# Patient Record
Sex: FEMALE | Race: WHITE | Employment: OTHER | URBAN - METROPOLITAN AREA
[De-identification: names, ages, dates, MRNs, and addresses within clinical notes are randomized per-mention and may not be internally consistent; named-entity substitution may affect disease eponyms.]

---

## 2023-09-14 PROBLEM — R44.8 FACIAL PRESSURE: Status: ACTIVE | Noted: 2023-09-14

## 2023-09-14 PROBLEM — F41.9 ANXIETY: Status: ACTIVE | Noted: 2023-09-14

## 2023-09-14 PROBLEM — E03.8 SUBCLINICAL HYPOTHYROIDISM: Status: ACTIVE | Noted: 2023-09-14

## 2023-09-14 PROBLEM — H25.10 NUCLEAR SENILE CATARACT: Status: ACTIVE | Noted: 2023-09-14

## 2023-09-14 PROBLEM — H04.129 TEAR FILM INSUFFICIENCY: Status: ACTIVE | Noted: 2023-09-14

## 2023-09-14 PROBLEM — H52.7 UNSPECIFIED DISORDER OF REFRACTION: Status: ACTIVE | Noted: 2023-09-14

## 2023-09-14 PROBLEM — J32.9 RECURRENT SINUSITIS: Status: ACTIVE | Noted: 2023-09-14

## 2023-09-14 PROBLEM — E78.00 HYPERCHOLESTEROLEMIA: Status: ACTIVE | Noted: 2023-09-14

## 2023-09-14 PROBLEM — E55.9 VITAMIN D DEFICIENCY: Status: ACTIVE | Noted: 2023-09-14

## 2023-09-14 PROBLEM — J30.9 ALLERGIC RHINITIS: Status: ACTIVE | Noted: 2023-09-14

## 2023-09-14 PROBLEM — Z85.72 HISTORY OF NON-HODGKIN'S LYMPHOMA: Status: ACTIVE | Noted: 2023-09-14

## 2023-09-14 PROBLEM — H40.1132 PRIMARY OPEN ANGLE GLAUCOMA (POAG) OF BOTH EYES, MODERATE STAGE: Status: ACTIVE | Noted: 2023-09-14

## 2023-09-14 RX ORDER — LATANOPROST 50 UG/ML
1 SOLUTION/ DROPS OPHTHALMIC EVERY EVENING
COMMUNITY
Start: 2022-05-05 | End: 2024-01-29 | Stop reason: SDUPTHER

## 2023-09-14 RX ORDER — GLUCOSAM/CHONDRO/HERB 149/HYAL 750-100 MG
1 TABLET ORAL DAILY
COMMUNITY
End: 2023-12-20 | Stop reason: ALTCHOICE

## 2023-09-14 RX ORDER — CHOLECALCIFEROL (VITAMIN D3) 50 MCG
2000 TABLET ORAL DAILY
COMMUNITY
End: 2023-12-20 | Stop reason: ALTCHOICE

## 2023-09-14 RX ORDER — TIMOLOL MALEATE 5 MG/ML
1 SOLUTION/ DROPS OPHTHALMIC DAILY
COMMUNITY
End: 2023-12-20 | Stop reason: ALTCHOICE

## 2023-09-14 RX ORDER — IBUPROFEN 100 MG/5ML
1000 SUSPENSION, ORAL (FINAL DOSE FORM) ORAL DAILY
COMMUNITY
End: 2023-12-20 | Stop reason: ALTCHOICE

## 2023-09-20 DIAGNOSIS — Z12.31 SCREENING MAMMOGRAM FOR BREAST CANCER: ICD-10-CM

## 2023-09-20 DIAGNOSIS — C82.53 DIFFUSE FOLLICLE CENTER LYMPHOMA OF INTRA-ABDOMINAL LYMPH NODES (MULTI): Primary | ICD-10-CM

## 2023-10-02 ENCOUNTER — ANCILLARY PROCEDURE (OUTPATIENT)
Dept: RADIOLOGY | Facility: CLINIC | Age: 78
End: 2023-10-02
Payer: MEDICARE

## 2023-10-02 VITALS — BODY MASS INDEX: 28.32 KG/M2 | WEIGHT: 170 LBS | HEIGHT: 65 IN

## 2023-10-02 DIAGNOSIS — Z12.31 SCREENING MAMMOGRAM FOR BREAST CANCER: ICD-10-CM

## 2023-10-02 DIAGNOSIS — C82.53 DIFFUSE FOLLICLE CENTER LYMPHOMA, INTRA-ABDOMINAL LYMPH NODES (MULTI): Primary | ICD-10-CM

## 2023-10-02 PROCEDURE — G1004 CDSM NDSC: HCPCS

## 2023-10-02 PROCEDURE — 71270 CT THORAX DX C-/C+: CPT | Mod: MG

## 2023-10-02 PROCEDURE — 77063 BREAST TOMOSYNTHESIS BI: CPT | Mod: 50

## 2023-10-02 PROCEDURE — 2550000001 HC RX 255 CONTRASTS: Performed by: NURSE PRACTITIONER

## 2023-10-02 PROCEDURE — 74178 CT ABD&PLV WO CNTR FLWD CNTR: CPT | Mod: MG

## 2023-10-02 RX ADMIN — IOHEXOL 75 ML: 350 INJECTION, SOLUTION INTRAVENOUS at 15:36

## 2023-10-04 ENCOUNTER — TELEPHONE (OUTPATIENT)
Dept: HEMATOLOGY/ONCOLOGY | Facility: CLINIC | Age: 78
End: 2023-10-04
Payer: MEDICARE

## 2023-10-04 NOTE — TELEPHONE ENCOUNTER
Patient is calling to get CT results that was order by Kj Pinto, CNP - scan was completed at [facility] 10.02.2023.

## 2023-10-06 ENCOUNTER — TELEPHONE (OUTPATIENT)
Dept: OBSTETRICS AND GYNECOLOGY | Facility: CLINIC | Age: 78
End: 2023-10-06
Payer: MEDICARE

## 2023-10-06 NOTE — TELEPHONE ENCOUNTER
NP to practice ( was last seen by wc 02/25/2014 Annual ) . Pt states that she was referred by Kj Pinto CNP to see Dr German for endometrial thickening / pt states has ct scan 09/29/2023 / per ct endometrial thickening 10 mm / advised pt a message would be sent to Dr German to view CT scan / pt aware Dr German out of the office today /office will call with  recommendation

## 2023-10-09 ENCOUNTER — TELEPHONE (OUTPATIENT)
Dept: PRIMARY CARE | Facility: CLINIC | Age: 78
End: 2023-10-09
Payer: MEDICARE

## 2023-10-09 DIAGNOSIS — R93.89 ABNORMAL VAGINAL BLEEDING WITH ENDOMETRIAL THICKNESS GREATER THAN 5 MM PRESENT ON TRANSVAGINAL ULTRASOUND IN POSTMENOPAUSAL PATIENT: Primary | ICD-10-CM

## 2023-10-09 DIAGNOSIS — J40 BRONCHITIS: Primary | ICD-10-CM

## 2023-10-09 DIAGNOSIS — N95.0 ABNORMAL VAGINAL BLEEDING WITH ENDOMETRIAL THICKNESS GREATER THAN 5 MM PRESENT ON TRANSVAGINAL ULTRASOUND IN POSTMENOPAUSAL PATIENT: Primary | ICD-10-CM

## 2023-10-09 RX ORDER — AZITHROMYCIN 250 MG/1
TABLET, FILM COATED ORAL
Qty: 6 TABLET | Refills: 0 | Status: SHIPPED | OUTPATIENT
Start: 2023-10-09 | End: 2023-10-19 | Stop reason: WASHOUT

## 2023-10-09 NOTE — TELEPHONE ENCOUNTER
Pt c/o fever .8, heavyness in chest - chest congestion, sore throat -scratchy now, cough, headache, Covid test neg. Sick  X 4 days   [facility]  [place] [facility]   Last visit 4/18/23 sloan, no future appt.   You have not seen pt since 2020

## 2023-10-10 NOTE — TELEPHONE ENCOUNTER
Spoke to pt advised need for US / CS number given / pt aware she needs to call office with US date so f/up appt with WC can be sched

## 2023-10-11 ENCOUNTER — ANCILLARY PROCEDURE (OUTPATIENT)
Dept: RADIOLOGY | Facility: CLINIC | Age: 78
End: 2023-10-11
Payer: MEDICARE

## 2023-10-11 DIAGNOSIS — R93.89 ABNORMAL VAGINAL BLEEDING WITH ENDOMETRIAL THICKNESS GREATER THAN 5 MM PRESENT ON TRANSVAGINAL ULTRASOUND IN POSTMENOPAUSAL PATIENT: ICD-10-CM

## 2023-10-11 DIAGNOSIS — N95.0 ABNORMAL VAGINAL BLEEDING WITH ENDOMETRIAL THICKNESS GREATER THAN 5 MM PRESENT ON TRANSVAGINAL ULTRASOUND IN POSTMENOPAUSAL PATIENT: ICD-10-CM

## 2023-10-13 NOTE — PROGRESS NOTES
Endometrial biopsy    Date/Time: 10/19/2023 1:22 PM    Performed by: Susan German MD  Authorized by: Susan German MD    Consent:     Consent obtained: written    Consent given by: patient    Risks discussed: bleeding and pain    Alternatives discussed: observation and referral    Patient agrees, verbalizes understanding, and wants to proceed: yes      Procedure explained and questions answered to patient or proxy's satisfaction: yes    Indications:     Indications: thickened endometrium    Pre-procedure:     Urine pregnancy test: N/A      Premeds: misoprostol and NSAID    Procedure:     A bimanual exam was performed: yes      Uterus size: 6-8 weeks    Uterus position: retroverted    Prepped with: Betadine    Tenaculum used: yes      A local block was performed: yes      Block method: paracervical block      Local anesthetic: lidocaine 2% WITH epi    Amount used (mL): 1.5    Cervix dilated: yes      Number of passes: 3  Findings:     Cervix: normal      Uterus depth by sound (cm): 5    Specimen collected: specimen collected and sent to pathology      Patient tolerance: tolerated well, no immediate complications  Comments:     Procedure comments: Polypoid lesion extruding from os; does NOT completely avulse with Alligator forceps; sample obtained as well as upper endometrial tissue aspirated w pipelle.

## 2023-10-19 ENCOUNTER — PROCEDURE VISIT (OUTPATIENT)
Dept: OBSTETRICS AND GYNECOLOGY | Facility: CLINIC | Age: 78
End: 2023-10-19
Payer: MEDICARE

## 2023-10-19 VITALS — DIASTOLIC BLOOD PRESSURE: 68 MMHG | BODY MASS INDEX: 29.16 KG/M2 | WEIGHT: 175 LBS | SYSTOLIC BLOOD PRESSURE: 128 MMHG

## 2023-10-19 DIAGNOSIS — N95.0 ABNORMAL VAGINAL BLEEDING WITH ENDOMETRIAL THICKNESS GREATER THAN 5 MM PRESENT ON TRANSVAGINAL ULTRASOUND IN POSTMENOPAUSAL PATIENT: Primary | ICD-10-CM

## 2023-10-19 DIAGNOSIS — R93.89 ABNORMAL VAGINAL BLEEDING WITH ENDOMETRIAL THICKNESS GREATER THAN 5 MM PRESENT ON TRANSVAGINAL ULTRASOUND IN POSTMENOPAUSAL PATIENT: Primary | ICD-10-CM

## 2023-10-19 PROCEDURE — 58100 BIOPSY OF UTERUS LINING: CPT | Performed by: OBSTETRICS & GYNECOLOGY

## 2023-10-19 PROCEDURE — 99999 PR OFFICE/OUTPT VISIT,PROCEDURE ONLY: CPT | Performed by: OBSTETRICS & GYNECOLOGY

## 2023-10-19 PROCEDURE — 88305 TISSUE EXAM BY PATHOLOGIST: CPT | Performed by: STUDENT IN AN ORGANIZED HEALTH CARE EDUCATION/TRAINING PROGRAM

## 2023-10-19 PROCEDURE — 88305 TISSUE EXAM BY PATHOLOGIST: CPT | Mod: TC,SUR | Performed by: OBSTETRICS & GYNECOLOGY

## 2023-10-19 ASSESSMENT — LIFESTYLE VARIABLES
SKIP TO QUESTIONS 9-10: 1
HOW OFTEN DO YOU HAVE SIX OR MORE DRINKS ON ONE OCCASION: NEVER
HOW OFTEN DO YOU HAVE A DRINK CONTAINING ALCOHOL: MONTHLY OR LESS
AUDIT-C TOTAL SCORE: 1
HOW MANY STANDARD DRINKS CONTAINING ALCOHOL DO YOU HAVE ON A TYPICAL DAY: 1 OR 2

## 2023-10-19 ASSESSMENT — ENCOUNTER SYMPTOMS
LOSS OF SENSATION IN FEET: 0
DEPRESSION: 0
OCCASIONAL FEELINGS OF UNSTEADINESS: 0

## 2023-10-19 ASSESSMENT — PATIENT HEALTH QUESTIONNAIRE - PHQ9
1. LITTLE INTEREST OR PLEASURE IN DOING THINGS: NOT AT ALL
SUM OF ALL RESPONSES TO PHQ9 QUESTIONS 1 AND 2: 0
2. FEELING DOWN, DEPRESSED OR HOPELESS: NOT AT ALL

## 2023-10-19 NOTE — PROGRESS NOTES
NEW pt to [facility]; last gyn exam 2014 w WC; under tx for lymphoma at [facility];   here due to INCIDENTAL finding thickened endometrium during CT scan for lymphoma surveillance. Completed pelvic us=2cm complex cystic/solid tissue within endometrium.    Pt denies any/all vag bleed.

## 2023-10-27 ENCOUNTER — TELEPHONE (OUTPATIENT)
Dept: OBSTETRICS AND GYNECOLOGY | Facility: CLINIC | Age: 78
End: 2023-10-27
Payer: MEDICARE

## 2023-10-27 NOTE — TELEPHONE ENCOUNTER
Est pt last seen 10/19/2023  EMB / pt returning WC call for BX results / advised pt Dr German in surgery today / advised will send message to Dr German of returned call / pt state that she will have cell phone available throughout the day . Message to WC

## 2023-10-27 NOTE — TELEPHONE ENCOUNTER
Spoke to pt advised of Dr Hernández message / advised Dr German or office will call when results reviewed by Dr German

## 2023-10-30 LAB
LABORATORY COMMENT REPORT: NORMAL
PATH REPORT.COMMENTS IMP SPEC: NORMAL
PATH REPORT.FINAL DX SPEC: NORMAL
PATH REPORT.GROSS SPEC: NORMAL
PATH REPORT.RELEVANT HX SPEC: NORMAL
PATH REPORT.TOTAL CANCER: NORMAL

## 2023-10-31 NOTE — RESULT ENCOUNTER NOTE
Patient needs D and C for very thickened endometrium/probable polyp; plz advise dates OR blocks still have time left for 2023.

## 2023-11-05 PROBLEM — R93.5 ABNORMAL ULTRASOUND OF ENDOMETRIUM: Status: ACTIVE | Noted: 2023-11-05

## 2023-11-05 NOTE — PROGRESS NOTES
"Koi:  Wendy is an established 78-year-old white female here with her  Ed to schedule a hysteroscopic D&C.  Patient w/o gyn care since 2014, called 10/6/2023 to report incidental finding of 10 mm endometrium  on CAT scan that was ordered by her heme-onc nurse practitioner as part of her routine surveillance for her non-Hodgkin's lymphoma.  Subsequent ultrasound evaluation on 10/11/2023  reports a  2.2 x 1.7 cm  complex cystic/solid tissue growth within uterine cavity;   10/19/2023 pipelle bx= fragments atrophic endometrium, nl endocerv glandular and squamous cells.  Which is consistent with focal lesions not fully biopsied by Pipelle.  Pt denies vag bleed or pain.  OBJ: Visit Vitals  /68   Ht 1.626 m (5' 4\")   Wt 79.4 kg (175 lb)   BMI 30.04 kg/m²   OB Status Postmenopausal   Smoking Status Never   BSA 1.89 m²    Exam: alert and oriented x 3; pleasant  Encounter Diagnosis   Name Primary?    Abnormal ultrasound of endometrium;  irregular appearing endometrial growth needing tissue sampling/resection.  Using anatomic diagrams, the procedural details of hysteroscopy D&C were explained to the patient and her .  Specific mention was made of risks of bleeding, infection, and uterine perforation necessitating laparotomy.  Thus consented, patient wishes to proceed .patient was given the [employer] pamphlet on hysteroscopy to review at her leisure at home. Yes   Given age, history of lymphoma, and no recent physical exam I am referring her back to her primary care physician Dr. Kirkland for preoperative medical clearance.    PCP= Marita; needs preop clearance  "

## 2023-11-07 ENCOUNTER — PREP FOR PROCEDURE (OUTPATIENT)
Dept: OBSTETRICS AND GYNECOLOGY | Facility: CLINIC | Age: 78
End: 2023-11-07
Payer: MEDICARE

## 2023-11-07 ENCOUNTER — OFFICE VISIT (OUTPATIENT)
Dept: OBSTETRICS AND GYNECOLOGY | Facility: CLINIC | Age: 78
End: 2023-11-07
Payer: MEDICARE

## 2023-11-07 VITALS
BODY MASS INDEX: 29.88 KG/M2 | DIASTOLIC BLOOD PRESSURE: 68 MMHG | HEIGHT: 64 IN | WEIGHT: 175 LBS | SYSTOLIC BLOOD PRESSURE: 118 MMHG

## 2023-11-07 DIAGNOSIS — R93.5 ABNORMAL ULTRASOUND OF ENDOMETRIUM: Primary | ICD-10-CM

## 2023-11-07 DIAGNOSIS — N95.0 PMB (POSTMENOPAUSAL BLEEDING): Primary | ICD-10-CM

## 2023-11-07 DIAGNOSIS — R93.5 ABNORMAL ULTRASOUND OF ENDOMETRIUM: ICD-10-CM

## 2023-11-07 PROCEDURE — 99214 OFFICE O/P EST MOD 30 MIN: CPT | Performed by: OBSTETRICS & GYNECOLOGY

## 2023-11-07 PROCEDURE — 1159F MED LIST DOCD IN RCRD: CPT | Performed by: OBSTETRICS & GYNECOLOGY

## 2023-11-07 PROCEDURE — 1036F TOBACCO NON-USER: CPT | Performed by: OBSTETRICS & GYNECOLOGY

## 2023-11-07 RX ORDER — GABAPENTIN 600 MG/1
600 TABLET ORAL ONCE
Status: CANCELLED | OUTPATIENT
Start: 2023-11-07 | End: 2023-11-07

## 2023-11-07 RX ORDER — ACETAMINOPHEN 325 MG/1
975 TABLET ORAL ONCE
Status: CANCELLED | OUTPATIENT
Start: 2023-11-07 | End: 2023-11-07

## 2023-11-07 RX ORDER — CELECOXIB 50 MG/1
400 CAPSULE ORAL ONCE
Status: CANCELLED | OUTPATIENT
Start: 2023-11-07 | End: 2023-11-07

## 2023-12-20 ENCOUNTER — PRE-ADMISSION TESTING (OUTPATIENT)
Dept: PREADMISSION TESTING | Facility: HOSPITAL | Age: 78
End: 2023-12-20
Payer: MEDICARE

## 2023-12-20 VITALS
WEIGHT: 175.71 LBS | OXYGEN SATURATION: 100 % | SYSTOLIC BLOOD PRESSURE: 134 MMHG | BODY MASS INDEX: 30 KG/M2 | DIASTOLIC BLOOD PRESSURE: 56 MMHG | TEMPERATURE: 97.9 F | HEIGHT: 64 IN | RESPIRATION RATE: 16 BRPM | HEART RATE: 56 BPM

## 2023-12-20 DIAGNOSIS — N95.0 PMB (POSTMENOPAUSAL BLEEDING): ICD-10-CM

## 2023-12-20 DIAGNOSIS — Z01.818 PRE-OP TESTING: Primary | ICD-10-CM

## 2023-12-20 DIAGNOSIS — R93.5 ABNORMAL ULTRASOUND OF ENDOMETRIUM: ICD-10-CM

## 2023-12-20 LAB
ABO GROUP (TYPE) IN BLOOD: NORMAL
ANTIBODY SCREEN: NORMAL
ERYTHROCYTE [DISTWIDTH] IN BLOOD BY AUTOMATED COUNT: 13.1 % (ref 11.5–14.5)
HCT VFR BLD AUTO: 46 % (ref 36–46)
HGB BLD-MCNC: 15 G/DL (ref 12–16)
MCH RBC QN AUTO: 29.8 PG (ref 26–34)
MCHC RBC AUTO-ENTMCNC: 32.6 G/DL (ref 32–36)
MCV RBC AUTO: 92 FL (ref 80–100)
NRBC BLD-RTO: 0 /100 WBCS (ref 0–0)
PLATELET # BLD AUTO: 219 X10*3/UL (ref 150–450)
RBC # BLD AUTO: 5.03 X10*6/UL (ref 4–5.2)
RH FACTOR (ANTIGEN D): NORMAL
WBC # BLD AUTO: 6.7 X10*3/UL (ref 4.4–11.3)

## 2023-12-20 PROCEDURE — 86900 BLOOD TYPING SEROLOGIC ABO: CPT

## 2023-12-20 PROCEDURE — 99213 OFFICE O/P EST LOW 20 MIN: CPT | Performed by: NURSE PRACTITIONER

## 2023-12-20 PROCEDURE — 36415 COLL VENOUS BLD VENIPUNCTURE: CPT

## 2023-12-20 PROCEDURE — 85027 COMPLETE CBC AUTOMATED: CPT

## 2023-12-20 RX ORDER — ACETAMINOPHEN 500 MG
1000 TABLET ORAL EVERY 6 HOURS PRN
COMMUNITY
End: 2024-03-20 | Stop reason: WASHOUT

## 2023-12-20 RX ORDER — DORZOLAMIDE HYDROCHLORIDE AND TIMOLOL MALEATE 20; 5 MG/ML; MG/ML
1 SOLUTION/ DROPS OPHTHALMIC 2 TIMES DAILY
COMMUNITY
Start: 2023-10-30 | End: 2024-01-29 | Stop reason: SDUPTHER

## 2023-12-20 ASSESSMENT — ENCOUNTER SYMPTOMS
COUGH: 1
SINUS PRESSURE: 1

## 2023-12-20 ASSESSMENT — DUKE ACTIVITY SCORE INDEX (DASI)
TOTAL_SCORE: 39.45
CAN YOU CLIMB A FLIGHT OF STAIRS OR WALK UP A HILL: YES
CAN YOU DO LIGHT WORK AROUND THE HOUSE LIKE DUSTING OR WASHING DISHES: YES
CAN YOU RUN A SHORT DISTANCE: YES
CAN YOU TAKE CARE OF YOURSELF (EAT, DRESS, BATHE, OR USE TOILET): YES
CAN YOU PARTICIPATE IN STRENOUS SPORTS LIKE SWIMMING, SINGLES TENNIS, FOOTBALL, BASKETBALL, OR SKIING: NO
DASI METS SCORE: 7.6
CAN YOU DO HEAVY WORK AROUND THE HOUSE LIKE SCRUBBING FLOORS OR LIFTING AND MOVING HEAVY FURNITURE: YES
CAN YOU PARTICIPATE IN MODERATE RECREATIONAL ACTIVITIES LIKE GOLF, BOWLING, DANCING, DOUBLES TENNIS OR THROWING A BASEBALL OR FOOTBALL: NO
CAN YOU DO YARD WORK LIKE RAKING LEAVES, WEEDING OR PUSHING A MOWER: YES
CAN YOU HAVE SEXUAL RELATIONS: NO
CAN YOU DO MODERATE WORK AROUND THE HOUSE LIKE VACUUMING, SWEEPING FLOORS OR CARRYING GROCERIES: YES
CAN YOU WALK A BLOCK OR TWO ON LEVEL GROUND: YES
CAN YOU WALK INDOORS, SUCH AS AROUND YOUR HOUSE: YES

## 2023-12-20 ASSESSMENT — CHADS2 SCORE
PRIOR STROKE OR TIA OR THROMBOEMBOLISM: NO
DIABETES: NO
CHADS2 SCORE: 1
CHF: NO
HYPERTENSION: NO
AGE GREATER THAN OR EQUAL TO 75: YES

## 2023-12-20 ASSESSMENT — PAIN - FUNCTIONAL ASSESSMENT: PAIN_FUNCTIONAL_ASSESSMENT: 0-10

## 2023-12-20 ASSESSMENT — LIFESTYLE VARIABLES: SMOKING_STATUS: SMOKER

## 2023-12-20 ASSESSMENT — PAIN SCALES - GENERAL: PAINLEVEL_OUTOF10: 0 - NO PAIN

## 2023-12-20 NOTE — CPM/PAT H&P
CPM/PAT Evaluation       Name: Wendy Oswald (Wendy Oswald)  /Age: 1945/78 y.o.     In-Person       Chief Complaint:  removing polyp from uterus    HPI Found a uterine polyp on her routine gyn testing which has been done every 6 months since she has had NHL in  treated with chemo. She has no symptoms, preliminary testing showed it was benign but her GYN said needs to be removed. She denies any fever, chills, sob, abd pain, n/v/c/d. She has had some sinus drainage and has an occasional cough.    Past Medical History:   Diagnosis Date    Abnormal abdominal ultrasound     Allergic rhinitis     Anxiety     History of non-Hodgkin's lymphoma     Nuclear senile cataract     Personal history of malignant neoplasm, unspecified     History of malignant neoplasm    Postmenopausal bleeding     Primary open angle glaucoma of both eyes, moderate stage     Pure hypercholesterolemia     Sinusitis     Subclinical hypothyroidism        Past Surgical History:   Procedure Laterality Date    ANKLE ARTHROPLASTY      DILATION AND CURETTAGE OF UTERUS      OTHER SURGICAL HISTORY  2022    Intestinal surgery    TONSILLECTOMY         Patient  reports that she is not currently sexually active.    Family History   Problem Relation Name Age of Onset    Cervical cancer Mother      Tuberculosis Mother      No Known Problems Father      No Known Problems Brother      No Known Problems Daughter      No Known Problems Son      Cervical cancer Mother's Sister      Stroke Mother's Sister      Cervical cancer Father's Sister      Stroke Father's Sister      Hypertension Father's Sister      Lung cancer Maternal Grandmother      Heart attack Maternal Grandfather      Heart disease Maternal Grandfather      No Known Problems Sibling         Allergies   Allergen Reactions    Sulfa (Sulfonamide Antibiotics) Nausea/vomiting    House Dust Other     SNEEZING     Current Outpatient Medications   Medication Sig Dispense Refill     dorzolamide-timoloL (Cosopt) 22.3-6.8 mg/mL ophthalmic solution Administer 1 drop into both eyes 2 times a day.      acetaminophen (Tylenol) 500 mg tablet Take 2 tablets (1,000 mg) by mouth every 6 hours if needed for mild pain (1 - 3).      latanoprost (Xalatan) 0.005 % ophthalmic solution Administer 1 drop into affected eye(s) once daily in the evening.       No current facility-administered medications for this visit.      Review of Systems   HENT:  Positive for dental problem and sinus pressure.    Respiratory:  Positive for cough.    All other systems reviewed and are negative.       Physical Exam  Vitals reviewed.   Constitutional:       Appearance: She is obese.   HENT:      Head: Normocephalic.   Eyes:      Pupils: Pupils are equal, round, and reactive to light.   Cardiovascular:      Rate and Rhythm: Normal rate and regular rhythm.      Pulses: Normal pulses.      Heart sounds: Normal heart sounds.   Pulmonary:      Effort: Pulmonary effort is normal.      Breath sounds: Normal breath sounds.   Abdominal:      General: Bowel sounds are normal.      Palpations: Abdomen is soft.   Musculoskeletal:         General: Normal range of motion.      Cervical back: Normal range of motion.   Skin:     General: Skin is warm and dry.   Neurological:      General: No focal deficit present.      Mental Status: She is alert and oriented to person, place, and time.   Psychiatric:         Mood and Affect: Mood normal.         Behavior: Behavior normal.          PAT AIRWAY:   Airway:     Mallampati::  II    TM distance::  >3 FB    Neck ROM::  Full      Visit Vitals  /56   Pulse 56   Temp 36.6 °C (97.9 °F) (Temporal)   Resp 16       DASI Risk Score      Flowsheet Row Most Recent Value   DASI SCORE 39.45   METS Score (Will be calculated only when all the questions are answered) 7.6          Caprini DVT Assessment      Flowsheet Row Most Recent Value   DVT Score 7   Current Status Minor surgery planned, Varicose veins    History Prior major surgery   Age Over 75 years   BMI 30 or less          Modified Frailty Index    No data to display       CHADS2 Stroke Risk         N/A 3 - 100%: High Risk   2 - 3%: Medium Risk   0 - 2%: Low Risk     Last Change: N/A          This score determines the patient's risk of having a stroke if the patient has atrial fibrillation.        This score is not applicable to this patient. Components are not calculated.          Revised Cardiac Risk Index      Flowsheet Row Most Recent Value   Revised Cardiac Risk Calculator 0          Apfel Simplified Score      Flowsheet Row Most Recent Value   Apfel Simplified Score Calculator 1          Risk Analysis Index Results This Encounter    No data found in the last 1 encounters.       Stop Bang Score      Flowsheet Row Most Recent Value   Do you snore loudly? 1   Do you often feel tired or fatigued after your sleep? 1   Has anyone ever observed you stop breathing in your sleep? 0   Do you have or are you being treated for high blood pressure? 0   Recent BMI (Calculated) 30   Is BMI greater than 35 kg/m2? 0=No   Age older than 50 years old? 1=Yes   Is your neck circumference greater than 17 inches (Male) or 16 inches (Female)? 0   Gender - Male 0=No   STOP-BANG Total Score 3            Assessment and Plan:     PMB/abnormal vaginal bleeding- Hysteroscopy, D and C with excision  Hx of NHL- in remission since 2007  Subclinical hypothyroid- last tsh 9/2023 wnl  ASA- 11  Ariscat- low

## 2023-12-20 NOTE — PREPROCEDURE INSTRUCTIONS
Medication List            Accurate as of December 20, 2023 10:46 AM. Always use your most recent med list.                acetaminophen 500 mg tablet  Commonly known as: Tylenol  Notes to patient: Take as needed.     dorzolamide-timoloL 22.3-6.8 mg/mL ophthalmic solution  Commonly known as: Cosopt  Notes to patient: CONTINUE     latanoprost 0.005 % ophthalmic solution  Commonly known as: Xalatan  Notes to patient: CONTINUE                        PAT DISCHARGE INSTRUCTIONS    Please call the Same Day Surgery (SDS) Department of the hospital where your procedure will be performed after 2:00 PM the day before your surgery. If you are scheduled on a Monday, or a Tuesday following a Monday holiday, you will need to call on the last business day prior to your surgery.    [facility]  [address] [place] [address], [address] [phone]    [facility]  [address] [place] [address] [phone]    [facility]  [address] [place] [address]  [phone]    Please let your surgeon know if:      You develop any open sores, shingles, burning or painful urination as these may increase your risk of an infection.   You no longer wish to have the surgery.   Any other personal circumstances change that may lead to the need to cancel or defer this surgery-such as being sick or getting admitted to any hospital within one week of your planned procedure.    Your contact details change, such as a change of address or phone number.    Starting now:     Please DO NOT drink alcohol or smoke for 24 hours before surgery. It is well known that quitting smoking can make a huge difference to your health and recovery from surgery. The longer you abstain from smoking, the better your chances of a healthy recovery. If you need help with quitting, call 0-392-QUIT-NOW to be connected to a trained counselor who will discuss  the best methods to help you quit.     Before your surgery:    Please stop all supplements 7 days prior to surgery. Or as directed by your surgeon.   Please stop taking NSAID pain medicine such as Advil and Motrin 7 days before surgery.    If you develop any fever, cough, cold, rashes, cuts, scratches, scrapes, urinary symptoms or infection anywhere on your body (including teeth and gums) prior to surgery, please call your surgeon’s office as soon as possible. This may require treatment to reduce the chance of cancellation on the day of surgery.    The day before your surgery:   DIET- Do not eat any food after MIDNIGHT. May have 10 ounces of CLEAR LIQUIDS until TWO HOURS before your arrival time. This includes water, black tea or coffee (no milk ir cream), apple juice and electrolyte drinks (Gatorade). May chew gum until TWO hours before your surgery time.   Get a good night’s rest.  Use the special soap for bathing if you have been instructed to use one.    Scheduled surgery times may change and you will be notified if this occurs - please check your personal voicemail for any updates.     On the morning of surgery:   Wear comfortable, loose fitting clothes which open in the front. Please do not wear moisturizers, creams, lotions, makeup or perfume.    Please bring with you to surgery:   Photo ID and insurance card   Current list of medicines and allergies   Pacemaker/ Defibrillator/Heart stent cards   CPAP machine and mask    Slings/ splints/ crutches   A copy of your complete advanced directive/DHPOA.    Please do NOT bring with you to surgery:   All jewelry and valuables should be left at home.   Prosthetic devices such as contact lenses, hearing aids, dentures, eyelash extensions, hairpins and body piercings must be removed prior to going in to the surgical suite.    After outpatient surgery:   A responsible adult MUST accompany you at the time of discharge and stay with you for 24 hours after your surgery. You  may NOT drive yourself home after surgery.    Do not drive, operate machinery, make critical decisions or do activities that require co-ordination or balance until after a night’s sleep.   Do not drink alcoholic beverages for 24 hours.   Instructions for resuming your medications will be provided by your surgeon.    CALL YOUR DOCTOR AFTER SURGERY IF YOU HAVE:     Chills and/or a fever of 101° F or higher.    Redness, swelling, pus or drainage from your surgical wound or a bad smell from the wound.    Lightheadedness, fainting or confusion.    Persistent vomiting (throwing up) and are not able to eat or drink for 12 hours.    Three or more loose, watery bowel movements in 24 hours (diarrhea).   Difficulty or pain while urinating( after non-urological surgery)    Pain and swelling in your legs, especially if it is only on one side.    Difficulty breathing or are breathing faster than normal.    Any new concerning symptoms.

## 2023-12-29 ENCOUNTER — ANESTHESIA EVENT (OUTPATIENT)
Dept: OPERATING ROOM | Facility: HOSPITAL | Age: 78
End: 2023-12-29
Payer: MEDICARE

## 2023-12-29 ENCOUNTER — HOSPITAL ENCOUNTER (OUTPATIENT)
Facility: HOSPITAL | Age: 78
Setting detail: OUTPATIENT SURGERY
Discharge: HOME | End: 2023-12-29
Attending: OBSTETRICS & GYNECOLOGY | Admitting: OBSTETRICS & GYNECOLOGY
Payer: MEDICARE

## 2023-12-29 ENCOUNTER — ANESTHESIA (OUTPATIENT)
Dept: OPERATING ROOM | Facility: HOSPITAL | Age: 78
End: 2023-12-29
Payer: MEDICARE

## 2023-12-29 VITALS
DIASTOLIC BLOOD PRESSURE: 55 MMHG | RESPIRATION RATE: 17 BRPM | TEMPERATURE: 97.2 F | HEART RATE: 55 BPM | BODY MASS INDEX: 29.53 KG/M2 | WEIGHT: 173 LBS | HEIGHT: 64 IN | SYSTOLIC BLOOD PRESSURE: 125 MMHG | OXYGEN SATURATION: 99 %

## 2023-12-29 DIAGNOSIS — N95.0 ABNORMAL VAGINAL BLEEDING WITH ENDOMETRIAL THICKNESS GREATER THAN 5 MM PRESENT ON TRANSVAGINAL ULTRASOUND IN POSTMENOPAUSAL PATIENT: Primary | ICD-10-CM

## 2023-12-29 DIAGNOSIS — N95.0 PMB (POSTMENOPAUSAL BLEEDING): ICD-10-CM

## 2023-12-29 DIAGNOSIS — R93.5 ABNORMAL ULTRASOUND OF ENDOMETRIUM: ICD-10-CM

## 2023-12-29 DIAGNOSIS — R93.89 ABNORMAL VAGINAL BLEEDING WITH ENDOMETRIAL THICKNESS GREATER THAN 5 MM PRESENT ON TRANSVAGINAL ULTRASOUND IN POSTMENOPAUSAL PATIENT: Primary | ICD-10-CM

## 2023-12-29 DIAGNOSIS — G89.18 POSTOPERATIVE PAIN: ICD-10-CM

## 2023-12-29 LAB
ABO GROUP (TYPE) IN BLOOD: NORMAL
RH FACTOR (ANTIGEN D): NORMAL

## 2023-12-29 PROCEDURE — 3700000001 HC GENERAL ANESTHESIA TIME - INITIAL BASE CHARGE: Performed by: OBSTETRICS & GYNECOLOGY

## 2023-12-29 PROCEDURE — 2500000001 HC RX 250 WO HCPCS SELF ADMINISTERED DRUGS (ALT 637 FOR MEDICARE OP): Performed by: OBSTETRICS & GYNECOLOGY

## 2023-12-29 PROCEDURE — 7100000002 HC RECOVERY ROOM TIME - EACH INCREMENTAL 1 MINUTE: Performed by: OBSTETRICS & GYNECOLOGY

## 2023-12-29 PROCEDURE — 2500000004 HC RX 250 GENERAL PHARMACY W/ HCPCS (ALT 636 FOR OP/ED): Performed by: ANESTHESIOLOGIST ASSISTANT

## 2023-12-29 PROCEDURE — 2500000005 HC RX 250 GENERAL PHARMACY W/O HCPCS: Performed by: ANESTHESIOLOGIST ASSISTANT

## 2023-12-29 PROCEDURE — 7100000010 HC PHASE TWO TIME - EACH INCREMENTAL 1 MINUTE: Performed by: OBSTETRICS & GYNECOLOGY

## 2023-12-29 PROCEDURE — 7100000009 HC PHASE TWO TIME - INITIAL BASE CHARGE: Performed by: OBSTETRICS & GYNECOLOGY

## 2023-12-29 PROCEDURE — 3600000008 HC OR TIME - EACH INCREMENTAL 1 MINUTE - PROCEDURE LEVEL THREE: Performed by: OBSTETRICS & GYNECOLOGY

## 2023-12-29 PROCEDURE — 58558 HYSTEROSCOPY BIOPSY: CPT | Performed by: OBSTETRICS & GYNECOLOGY

## 2023-12-29 PROCEDURE — 99100 ANES PT EXTEME AGE<1 YR&>70: CPT | Performed by: ANESTHESIOLOGY

## 2023-12-29 PROCEDURE — A58558 PR HYSTEROSCOPY,W/ENDO BX: Performed by: ANESTHESIOLOGY

## 2023-12-29 PROCEDURE — 2720000007 HC OR 272 NO HCPCS: Performed by: OBSTETRICS & GYNECOLOGY

## 2023-12-29 PROCEDURE — 7100000001 HC RECOVERY ROOM TIME - INITIAL BASE CHARGE: Performed by: OBSTETRICS & GYNECOLOGY

## 2023-12-29 PROCEDURE — 3700000002 HC GENERAL ANESTHESIA TIME - EACH INCREMENTAL 1 MINUTE: Performed by: OBSTETRICS & GYNECOLOGY

## 2023-12-29 PROCEDURE — 94760 N-INVAS EAR/PLS OXIMETRY 1: CPT

## 2023-12-29 PROCEDURE — A58558 PR HYSTEROSCOPY,W/ENDO BX: Performed by: ANESTHESIOLOGIST ASSISTANT

## 2023-12-29 PROCEDURE — 88305 TISSUE EXAM BY PATHOLOGIST: CPT | Performed by: PATHOLOGY

## 2023-12-29 PROCEDURE — 88305 TISSUE EXAM BY PATHOLOGIST: CPT | Mod: TC | Performed by: OBSTETRICS & GYNECOLOGY

## 2023-12-29 PROCEDURE — C1782 MORCELLATOR: HCPCS | Performed by: OBSTETRICS & GYNECOLOGY

## 2023-12-29 PROCEDURE — A4649 SURGICAL SUPPLIES: HCPCS | Performed by: OBSTETRICS & GYNECOLOGY

## 2023-12-29 PROCEDURE — 3600000003 HC OR TIME - INITIAL BASE CHARGE - PROCEDURE LEVEL THREE: Performed by: OBSTETRICS & GYNECOLOGY

## 2023-12-29 PROCEDURE — 36415 COLL VENOUS BLD VENIPUNCTURE: CPT | Performed by: OBSTETRICS & GYNECOLOGY

## 2023-12-29 RX ORDER — FAMOTIDINE 20 MG/1
20 TABLET, FILM COATED ORAL ONCE
Status: DISCONTINUED | OUTPATIENT
Start: 2023-12-29 | End: 2023-12-29 | Stop reason: HOSPADM

## 2023-12-29 RX ORDER — SILVER NITRATE 38.21; 12.74 MG/1; MG/1
STICK TOPICAL AS NEEDED
Status: DISCONTINUED | OUTPATIENT
Start: 2023-12-29 | End: 2023-12-29 | Stop reason: HOSPADM

## 2023-12-29 RX ORDER — MIDAZOLAM HYDROCHLORIDE 1 MG/ML
INJECTION, SOLUTION INTRAMUSCULAR; INTRAVENOUS AS NEEDED
Status: DISCONTINUED | OUTPATIENT
Start: 2023-12-29 | End: 2023-12-29

## 2023-12-29 RX ORDER — OXYCODONE HCL 10 MG/1
10 TABLET, FILM COATED, EXTENDED RELEASE ORAL ONCE AS NEEDED
Status: CANCELLED | OUTPATIENT
Start: 2023-12-29

## 2023-12-29 RX ORDER — GABAPENTIN 600 MG/1
600 TABLET ORAL ONCE
Status: DISCONTINUED | OUTPATIENT
Start: 2023-12-29 | End: 2023-12-29 | Stop reason: ENTERED-IN-ERROR

## 2023-12-29 RX ORDER — PROPOFOL 10 MG/ML
INJECTION, EMULSION INTRAVENOUS AS NEEDED
Status: DISCONTINUED | OUTPATIENT
Start: 2023-12-29 | End: 2023-12-29

## 2023-12-29 RX ORDER — GABAPENTIN 300 MG/1
600 CAPSULE ORAL ONCE
Status: DISCONTINUED | OUTPATIENT
Start: 2023-12-29 | End: 2023-12-29 | Stop reason: ENTERED-IN-ERROR

## 2023-12-29 RX ORDER — GABAPENTIN 300 MG/1
300 CAPSULE ORAL ONCE
Status: COMPLETED | OUTPATIENT
Start: 2023-12-29 | End: 2023-12-29

## 2023-12-29 RX ORDER — LABETALOL HYDROCHLORIDE 5 MG/ML
10 INJECTION, SOLUTION INTRAVENOUS ONCE AS NEEDED
Status: DISCONTINUED | OUTPATIENT
Start: 2023-12-29 | End: 2023-12-29 | Stop reason: HOSPADM

## 2023-12-29 RX ORDER — ALBUTEROL SULFATE 0.83 MG/ML
2.5 SOLUTION RESPIRATORY (INHALATION) ONCE
Status: DISCONTINUED | OUTPATIENT
Start: 2023-12-29 | End: 2023-12-29 | Stop reason: HOSPADM

## 2023-12-29 RX ORDER — METOCLOPRAMIDE 10 MG/1
10 TABLET ORAL ONCE
Status: DISCONTINUED | OUTPATIENT
Start: 2023-12-29 | End: 2023-12-29 | Stop reason: HOSPADM

## 2023-12-29 RX ORDER — ALBUTEROL SULFATE 0.83 MG/ML
2.5 SOLUTION RESPIRATORY (INHALATION) ONCE AS NEEDED
Status: DISCONTINUED | OUTPATIENT
Start: 2023-12-29 | End: 2023-12-29 | Stop reason: HOSPADM

## 2023-12-29 RX ORDER — ACETAMINOPHEN 325 MG/1
975 TABLET ORAL ONCE
Status: COMPLETED | OUTPATIENT
Start: 2023-12-29 | End: 2023-12-29

## 2023-12-29 RX ORDER — CELECOXIB 200 MG/1
400 CAPSULE ORAL ONCE
Status: COMPLETED | OUTPATIENT
Start: 2023-12-29 | End: 2023-12-29

## 2023-12-29 RX ORDER — DIPHENHYDRAMINE HYDROCHLORIDE 50 MG/ML
12.5 INJECTION INTRAMUSCULAR; INTRAVENOUS ONCE AS NEEDED
Status: DISCONTINUED | OUTPATIENT
Start: 2023-12-29 | End: 2023-12-29 | Stop reason: HOSPADM

## 2023-12-29 RX ORDER — SODIUM CHLORIDE, SODIUM LACTATE, POTASSIUM CHLORIDE, CALCIUM CHLORIDE 600; 310; 30; 20 MG/100ML; MG/100ML; MG/100ML; MG/100ML
INJECTION, SOLUTION INTRAVENOUS CONTINUOUS PRN
Status: DISCONTINUED | OUTPATIENT
Start: 2023-12-29 | End: 2023-12-29

## 2023-12-29 RX ORDER — ONDANSETRON HYDROCHLORIDE 2 MG/ML
4 INJECTION, SOLUTION INTRAVENOUS ONCE AS NEEDED
Status: DISCONTINUED | OUTPATIENT
Start: 2023-12-29 | End: 2023-12-29 | Stop reason: HOSPADM

## 2023-12-29 RX ORDER — FENTANYL CITRATE 50 UG/ML
INJECTION, SOLUTION INTRAMUSCULAR; INTRAVENOUS AS NEEDED
Status: DISCONTINUED | OUTPATIENT
Start: 2023-12-29 | End: 2023-12-29

## 2023-12-29 RX ORDER — DEXAMETHASONE SODIUM PHOSPHATE 4 MG/ML
INJECTION, SOLUTION INTRA-ARTICULAR; INTRALESIONAL; INTRAMUSCULAR; INTRAVENOUS; SOFT TISSUE AS NEEDED
Status: DISCONTINUED | OUTPATIENT
Start: 2023-12-29 | End: 2023-12-29

## 2023-12-29 RX ORDER — IBUPROFEN 600 MG/1
600 TABLET ORAL EVERY 6 HOURS
Qty: 20 TABLET | Refills: 0 | Status: SHIPPED | OUTPATIENT
Start: 2023-12-29 | End: 2024-01-03

## 2023-12-29 RX ORDER — MEPERIDINE HYDROCHLORIDE 25 MG/ML
12.5 INJECTION INTRAMUSCULAR; INTRAVENOUS; SUBCUTANEOUS EVERY 10 MIN PRN
Status: DISCONTINUED | OUTPATIENT
Start: 2023-12-29 | End: 2023-12-29 | Stop reason: HOSPADM

## 2023-12-29 RX ORDER — PHENYLEPHRINE HYDROCHLORIDE 10 MG/ML
INJECTION INTRAVENOUS AS NEEDED
Status: DISCONTINUED | OUTPATIENT
Start: 2023-12-29 | End: 2023-12-29

## 2023-12-29 RX ORDER — HYDRALAZINE HYDROCHLORIDE 20 MG/ML
10 INJECTION INTRAMUSCULAR; INTRAVENOUS EVERY 30 MIN PRN
Status: DISCONTINUED | OUTPATIENT
Start: 2023-12-29 | End: 2023-12-29 | Stop reason: HOSPADM

## 2023-12-29 RX ORDER — LIDOCAINE HYDROCHLORIDE 10 MG/ML
INJECTION INFILTRATION; PERINEURAL AS NEEDED
Status: DISCONTINUED | OUTPATIENT
Start: 2023-12-29 | End: 2023-12-29

## 2023-12-29 RX ORDER — ONDANSETRON HYDROCHLORIDE 2 MG/ML
INJECTION, SOLUTION INTRAVENOUS AS NEEDED
Status: DISCONTINUED | OUTPATIENT
Start: 2023-12-29 | End: 2023-12-29

## 2023-12-29 RX ADMIN — GABAPENTIN 300 MG: 300 CAPSULE ORAL at 07:10

## 2023-12-29 RX ADMIN — MIDAZOLAM HYDROCHLORIDE 2 MG: 1 INJECTION, SOLUTION INTRAMUSCULAR; INTRAVENOUS at 07:34

## 2023-12-29 RX ADMIN — CELECOXIB 400 MG: 200 CAPSULE ORAL at 07:05

## 2023-12-29 RX ADMIN — FENTANYL CITRATE 50 MCG: 0.05 INJECTION, SOLUTION INTRAMUSCULAR; INTRAVENOUS at 07:41

## 2023-12-29 RX ADMIN — SODIUM CHLORIDE, POTASSIUM CHLORIDE, SODIUM LACTATE AND CALCIUM CHLORIDE: 600; 310; 30; 20 INJECTION, SOLUTION INTRAVENOUS at 07:34

## 2023-12-29 RX ADMIN — LIDOCAINE HYDROCHLORIDE 5 ML: 10 INJECTION, SOLUTION INFILTRATION; PERINEURAL at 07:41

## 2023-12-29 RX ADMIN — DEXAMETHASONE SODIUM PHOSPHATE 4 MG: 4 INJECTION, SOLUTION INTRA-ARTICULAR; INTRALESIONAL; INTRAMUSCULAR; INTRAVENOUS; SOFT TISSUE at 07:50

## 2023-12-29 RX ADMIN — PROPOFOL 120 MG: 10 INJECTION, EMULSION INTRAVENOUS at 07:41

## 2023-12-29 RX ADMIN — PHENYLEPHRINE HYDROCHLORIDE 100 MCG: 10 INJECTION, SOLUTION INTRAMUSCULAR; INTRAVENOUS; SUBCUTANEOUS at 07:52

## 2023-12-29 RX ADMIN — PHENYLEPHRINE HYDROCHLORIDE 100 MCG: 10 INJECTION, SOLUTION INTRAMUSCULAR; INTRAVENOUS; SUBCUTANEOUS at 08:01

## 2023-12-29 RX ADMIN — ACETAMINOPHEN 975 MG: 325 TABLET ORAL at 07:03

## 2023-12-29 RX ADMIN — ONDANSETRON HYDROCHLORIDE 4 MG: 2 INJECTION INTRAMUSCULAR; INTRAVENOUS at 07:50

## 2023-12-29 ASSESSMENT — COLUMBIA-SUICIDE SEVERITY RATING SCALE - C-SSRS
6. HAVE YOU EVER DONE ANYTHING, STARTED TO DO ANYTHING, OR PREPARED TO DO ANYTHING TO END YOUR LIFE?: NO
2. HAVE YOU ACTUALLY HAD ANY THOUGHTS OF KILLING YOURSELF?: NO
1. IN THE PAST MONTH, HAVE YOU WISHED YOU WERE DEAD OR WISHED YOU COULD GO TO SLEEP AND NOT WAKE UP?: NO

## 2023-12-29 ASSESSMENT — PAIN SCALES - GENERAL
PAINLEVEL_OUTOF10: 2
PAINLEVEL_OUTOF10: 0 - NO PAIN
PAINLEVEL_OUTOF10: 1
PAIN_LEVEL: 2
PAINLEVEL_OUTOF10: 0 - NO PAIN
PAINLEVEL_OUTOF10: 0 - NO PAIN

## 2023-12-29 ASSESSMENT — PAIN - FUNCTIONAL ASSESSMENT
PAIN_FUNCTIONAL_ASSESSMENT: 0-10

## 2023-12-29 ASSESSMENT — PAIN DESCRIPTION - DESCRIPTORS
DESCRIPTORS: CRAMPING
DESCRIPTORS: CRAMPING

## 2023-12-29 NOTE — ANESTHESIA PROCEDURE NOTES
Airway  Date/Time: 12/29/2023 7:43 AM  Urgency: elective    Airway not difficult    Staffing  Performed: ROMAIN   Authorized by: Jef Rene DO    Performed by: ROMAIN Walter  Patient location during procedure: OR    Indications and Patient Condition  Indications for airway management: anesthesia  Spontaneous ventilation: present  Sedation level: deep  Preoxygenated: yes  Patient position: sniffing  MILS not maintained throughout  Mask difficulty assessment: 0 - not attempted    Final Airway Details  Final airway type: supraglottic airway      Successful airway: classic  Size 4     Number of attempts at approach: 1  Number of other approaches attempted: 0

## 2023-12-29 NOTE — ANESTHESIA POSTPROCEDURE EVALUATION
Patient: Wendy Oswald    Procedure Summary       Date: 12/29/23 Room / Location: [facility] OR 01 / [facility]    Anesthesia Start: 0735 Anesthesia Stop: 0818    Procedure: Hysteroscopy D&C with Excision Tissue (Vagina ) Diagnosis:       PMB (postmenopausal bleeding)      Abnormal ultrasound of endometrium      (PMB (postmenopausal bleeding) [N95.0])      (Abnormal ultrasound of endometrium [R93.5])    Surgeons: Susan German MD Responsible Provider: Jef Rene DO    Anesthesia Type: general ASA Status: 3            Anesthesia Type: general    Vitals Value Taken Time   /67 12/29/23 0845   Temp nf 12/29/23 0849   Pulse 54 12/29/23 0848   Resp 10 12/29/23 0848   SpO2 100 % 12/29/23 0848   Vitals shown include unvalidated device data.    Anesthesia Post Evaluation    Patient location during evaluation: bedside  Patient participation: complete - patient participated  Level of consciousness: awake  Pain score: 2  Pain management: adequate  Airway patency: patent  Two or more strategies used to mitigate risk of obstructive sleep apnea  Cardiovascular status: acceptable  Respiratory status: acceptable  Hydration status: acceptable  Postoperative Nausea and Vomiting: none        There were no known notable events for this encounter.

## 2023-12-29 NOTE — DISCHARGE INSTRUCTIONS
Your postop appt is 01/11/24 at 13:45; call if soaking a maxi pad within 1 hour, heavy clotting, or worsening pain.

## 2023-12-29 NOTE — OP NOTE
Hysteroscopy D&C with Excision Tissue Operative Note     Date: 2023  OR Location: [facility]    Name: Wendy Oswald : 1945, Age: 78 y.o., MRN: [id number], Sex: female    Diagnosis  Pre-op Diagnosis     * PMB (postmenopausal bleeding) [N95.0]     * Abnormal ultrasound of endometrium [R93.5] Post-op Diagnosis     * PMB (postmenopausal bleeding) [N95.0]     * Abnormal ultrasound of endometrium [R93.5]     Procedures  Hysteroscopy D&C with Excision Tissue  30580 - [place] HYSTEROSCOPY BX ENDOMETRIUM&/POLYPC W/WO D&C    [place] HYSTEROSCOPY BX ENDOMETRIUM&/POLYPC W/WO D&C [83815]  Surgeons      * Susan German - Primary    Resident/Fellow/Other Assistant:  Surgeon(s) and Role:    Procedure Summary  Anesthesia: Consult  ASA: III  Anesthesia Staff: Anesthesiologist: Jef Rene DO  C-AA: ROMAIN Walter  Estimated Blood Loss: 10mL  Intra-op Medications: * No intraprocedure medications in log *           Anesthesia Record               Intraprocedure I/O Totals       None           Specimen:   ID Type Source Tests Collected by Time   1 : endometrial polyp Tissue ENDOMETRIUM POLYPECTOMY - HYSTEROSCOPIC SURGICAL PATHOLOGY EXAM Susan German MD 2023 0802        Staff:   Circulator: Nava Bradley RN  Scrub Person: Jeffrey Lerner RN         Drains and/or Catheters: * None in log *    Tourniquet Times:         Implants:     Findings: Single endometrial polyp arising from posterior fundal region, approximately 2 cm; remainder of cavity normal configuration and atrophic; excellent visualization of both tubal ostium and fundus    Indications: Wendy Oswald is an 78 y.o. female who is having surgery for PMB (postmenopausal bleeding) [N95.0]  Abnormal ultrasound of endometrium [R93.5].     The patient was seen in the preoperative area. The risks, benefits, complications, treatment options, non-operative alternatives, expected recovery and outcomes were discussed with the patient. The possibilities of  reaction to medication, pulmonary aspiration, injury to surrounding structures, bleeding, recurrent infection, the need for additional procedures, failure to diagnose a condition, and creating a complication requiring transfusion or operation were discussed with the patient. The patient concurred with the proposed plan, giving informed consent.  The site of surgery was properly noted/marked if necessary per policy. The patient has been actively warmed in preoperative area. Preoperative antibiotics are not indicated. Venous thrombosis prophylaxis are not indicated.    Procedure Details: see below  Complications:  None; patient tolerated the procedure well.    Disposition: PACU - hemodynamically stable.  Condition: stable         Additional Details: .  The patient was taken to the operating room with her IV fluids running. The patient was identified and a timeout was performed. She was placed under general anesthesia. She was positioned in dorsal lithotomy position. An examination under anesthesia was performed with a small, mobile, retro-verted uterus/sounds to 5 cm. She was then prepped and draped in the normal sterile fashion. A red rubber catheter was placed in the bladder and the bladder  was drained. The anterior lip of the cervix was grasped with a single-tooth tenaculum, and the cervix was dilated until the hysteroscope was able to be inserted. The hysteroscope was introduced into the uterus. The above findings were noted.  MyoSure resection of the endometrial polyp was performed under direct vision without difficulty.  This allows assurance of complete resection as well as ensuring cavity integrity .no active bleeding noted .  At the end of the procedure the tenaculum sites were noted to be mildly friable and therefore silver nitrate was applied to achieve hemostasis. Sponge, needle, and instrument counts were correct times two.    Attending Attestation: I was present and scrubbed for the entire  procedure.    Susan FARIAS Clinger  Phone Number: [phone]

## 2023-12-29 NOTE — H&P
History Of Present Illness  Wendy Oswald is a 78 y.o. female presenting with incidental finding of abnormally thickened endometrium on CAT scan.  Subsequent pelvic ultrasound confirms thickened and heterogenous endometrium.  In office sampling consistent with benign tissue therefore patient is referred for hysteroscopic evaluation and evacuation of all intracavitary tissue for pathology evaluation.  Patient denies any current vaginal bleeding.  Past Medical History  Past Medical History:   Diagnosis Date    Abnormal abdominal ultrasound     Allergic rhinitis     Anxiety     History of non-Hodgkin's lymphoma     Nuclear senile cataract     Personal history of malignant neoplasm, unspecified     History of malignant neoplasm    Postmenopausal bleeding     Primary open angle glaucoma of both eyes, moderate stage     Pure hypercholesterolemia     Sinusitis     Subclinical hypothyroidism        Surgical History  Past Surgical History:   Procedure Laterality Date    ANKLE ARTHROPLASTY      DILATION AND CURETTAGE OF UTERUS      OTHER SURGICAL HISTORY  05/18/2022    Intestinal surgery    TONSILLECTOMY          Social History  She reports that she quit smoking about 52 years ago. Her smoking use included cigarettes. She has never used smokeless tobacco. She reports current alcohol use of about 2.0 standard drinks of alcohol per week. She reports that she does not use drugs.    Family History  Family History   Problem Relation Name Age of Onset    Cervical cancer Mother      Tuberculosis Mother      No Known Problems Father      No Known Problems Brother      No Known Problems Daughter      No Known Problems Son      Cervical cancer Mother's Sister      Stroke Mother's Sister      Cervical cancer Father's Sister      Stroke Father's Sister      Hypertension Father's Sister      Lung cancer Maternal Grandmother      Heart attack Maternal Grandfather      Heart disease Maternal Grandfather      No Known Problems Sibling    "       Allergies  Sulfa (sulfonamide antibiotics) and House dust    Review of Systems     Physical Exam     Last Recorded Vitals  Blood pressure 125/55, pulse 55, temperature 36.2 °C (97.2 °F), temperature source Temporal, resp. rate 17, height 1.626 m (5' 4\"), weight 78.5 kg (173 lb), SpO2 99 %.    Relevant Results  Pelvic ultrasound 10/11/2023= 6.5 cm uterus, retroverted.  Endometrial canal remarkable for septated cystlike focus; complex area measuring 2.2 x 1.7 cm with some vascular flow remainder of endometrium 8 mm; normal ovaries; no free fluid in pelvis.        Assessment/Plan   Principal Problem:    PMB (postmenopausal bleeding)  Active Problems:    Abnormal ultrasound of endometrium             I spent 20 minutes in the professional and overall care of this patient.      Susan German MD    "

## 2023-12-29 NOTE — ANESTHESIA PREPROCEDURE EVALUATION
Patient: Wendy Oswald    Procedure Information       Date/Time: 12/29/23 0730    Procedure: Hysteroscopy D&C with Excision Tissue - MYOSURE    [facility]    Surgeons: Susan German MD            Relevant Problems   Cardiovascular   (+) Hypercholesterolemia      Endocrine   (+) Subclinical hypothyroidism      Neuro/Psych   (+) Anxiety      Eyes, Ears, Nose, and Throat   (+) Primary open angle glaucoma (POAG) of both eyes, moderate stage       Clinical information reviewed:    Allergies  Meds     OB Status           NPO Detail:  NPO/Void Status  Carbonhydrate Drink Given Prior to Surgery? : N  Date of Last Liquid: 12/28/23  Time of Last Liquid: 2300  Date of Last Solid: 12/28/23  Time of Last Solid: 1730  Last Intake Type: Clear fluids; Solid meal  Time of Last Void: 0500         Physical Exam    Airway  Mallampati: II     Cardiovascular   Rhythm: regular  Rate: normal     Dental - normal exam     Pulmonary   Breath sounds clear to auscultation     Abdominal   Abdomen: soft             Anesthesia Plan    ASA 3     general     intravenous induction   Postoperative administration of opioids is intended.  Anesthetic plan and risks discussed with patient.    Plan discussed with CRNA, attending and CAA.

## 2023-12-29 NOTE — POST-PROCEDURE NOTE
Patient and spouse given discharge instructions, both verbalize understanding.  Patient eating cookies and drinking Coke.

## 2024-01-02 LAB
LABORATORY COMMENT REPORT: NORMAL
PATH REPORT.FINAL DX SPEC: NORMAL
PATH REPORT.GROSS SPEC: NORMAL
PATH REPORT.RELEVANT HX SPEC: NORMAL
PATH REPORT.TOTAL CANCER: NORMAL

## 2024-01-11 ENCOUNTER — TELEPHONE (OUTPATIENT)
Dept: OBSTETRICS AND GYNECOLOGY | Facility: CLINIC | Age: 79
End: 2024-01-11
Payer: MEDICARE

## 2024-01-11 ENCOUNTER — APPOINTMENT (OUTPATIENT)
Dept: OBSTETRICS AND GYNECOLOGY | Facility: CLINIC | Age: 79
End: 2024-01-11
Payer: MEDICARE

## 2024-01-11 NOTE — TELEPHONE ENCOUNTER
Est pt left vm that she needed to cancel her post op appt due to needing to take her  to th ER / pt states that she will call office to resched .

## 2024-01-11 NOTE — PROGRESS NOTES
Subjective   Wendy Oswald is a 78 y.o. female s/p  h/s eval  for abnl endometrium on ultrasound/resection polyp.   Pathology: benign polyp  The patient is not having any pain.    Objective   There were no vitals taken for this visit.  General:  alert and oriented, in no acute distress   Abdomen: soft, bowel sounds active, non-tender   Incision:   healing well, no drainage, no erythema, 2no seroma, no swelling, no dehiscence, incision well approximated       Assessment/Plan    Doing well postoperatively.  Operative findings again reviewed. Pathology report discussed.    1. Continue any current medications.  2. Activity restrictions: none  3. Follow up: 1 or prn

## 2024-01-21 PROBLEM — N84.0 ENDOMETRIAL POLYP: Status: ACTIVE | Noted: 2024-01-21

## 2024-01-21 PROBLEM — Z48.89 POSTOPERATIVE VISIT: Status: ACTIVE | Noted: 2024-01-21

## 2024-01-21 NOTE — PROGRESS NOTES
"Subjective   Wedny Oswald is a 78 y.o. female who  3 wks s/p  Procedure h/s myosure resection of endometrial polyp..   Pathology: benign polyp; remiander cavity atrophic  the patient is not having any pain. Reports scant bleeding 1-2 days postop.   [facility] hosp for pancreatitis at .  Objective Visit Vitals  /65   Ht 1.626 m (5' 4\")   Wt 78.4 kg (172 lb 12.8 oz)   BMI 29.66 kg/m²   OB Status Postmenopausal   Smoking Status Former   BSA 1.88 m²      General:  alert and oriented, in no acute distress     Vagina:  Cervix: Post perineum/intergluteal skin w lichen sclerosus; some  tiny focal abrasions; atrophic vagina but NO blood; cervical os closed and tenac sites healed.   Uterus:    Assessment/Plan    Doing well postoperatively.resume routine care.  Operative findings again reviewed. Pathology report discussed.  1. Continue any current medications.  2. Activity restrictions: none  3. Follow up: 1 or prn  "

## 2024-01-24 ENCOUNTER — OFFICE VISIT (OUTPATIENT)
Dept: OBSTETRICS AND GYNECOLOGY | Facility: CLINIC | Age: 79
End: 2024-01-24
Payer: MEDICARE

## 2024-01-24 VITALS
SYSTOLIC BLOOD PRESSURE: 139 MMHG | BODY MASS INDEX: 29.5 KG/M2 | WEIGHT: 172.8 LBS | DIASTOLIC BLOOD PRESSURE: 65 MMHG | HEIGHT: 64 IN

## 2024-01-24 DIAGNOSIS — N95.0 PMB (POSTMENOPAUSAL BLEEDING): ICD-10-CM

## 2024-01-24 DIAGNOSIS — Z48.89 POSTOPERATIVE VISIT: Primary | ICD-10-CM

## 2024-01-24 DIAGNOSIS — R93.5 ABNORMAL ULTRASOUND OF ENDOMETRIUM: ICD-10-CM

## 2024-01-24 DIAGNOSIS — N84.0 ENDOMETRIAL POLYP: ICD-10-CM

## 2024-01-24 PROCEDURE — 1036F TOBACCO NON-USER: CPT | Performed by: OBSTETRICS & GYNECOLOGY

## 2024-01-24 PROCEDURE — 1159F MED LIST DOCD IN RCRD: CPT | Performed by: OBSTETRICS & GYNECOLOGY

## 2024-01-24 PROCEDURE — 1126F AMNT PAIN NOTED NONE PRSNT: CPT | Performed by: OBSTETRICS & GYNECOLOGY

## 2024-01-24 PROCEDURE — 99024 POSTOP FOLLOW-UP VISIT: CPT | Performed by: OBSTETRICS & GYNECOLOGY

## 2024-01-24 ASSESSMENT — ENCOUNTER SYMPTOMS
DEPRESSION: 0
LOSS OF SENSATION IN FEET: 0
OCCASIONAL FEELINGS OF UNSTEADINESS: 0

## 2024-01-24 ASSESSMENT — PAIN SCALES - GENERAL: PAINLEVEL: 0-NO PAIN

## 2024-01-24 ASSESSMENT — LIFESTYLE VARIABLES
HOW MANY STANDARD DRINKS CONTAINING ALCOHOL DO YOU HAVE ON A TYPICAL DAY: 1 OR 2
SKIP TO QUESTIONS 9-10: 1
HOW OFTEN DO YOU HAVE SIX OR MORE DRINKS ON ONE OCCASION: NEVER
HOW OFTEN DO YOU HAVE A DRINK CONTAINING ALCOHOL: MONTHLY OR LESS
AUDIT-C TOTAL SCORE: 1

## 2024-01-24 ASSESSMENT — PATIENT HEALTH QUESTIONNAIRE - PHQ9
2. FEELING DOWN, DEPRESSED OR HOPELESS: NOT AT ALL
SUM OF ALL RESPONSES TO PHQ9 QUESTIONS 1 & 2: 0
1. LITTLE INTEREST OR PLEASURE IN DOING THINGS: NOT AT ALL

## 2024-01-29 ENCOUNTER — OFFICE VISIT (OUTPATIENT)
Dept: OPHTHALMOLOGY | Facility: CLINIC | Age: 79
End: 2024-01-29
Payer: MEDICARE

## 2024-01-29 DIAGNOSIS — H25.13 NUCLEAR SENILE CATARACT OF BOTH EYES: ICD-10-CM

## 2024-01-29 DIAGNOSIS — H40.1132 PRIMARY OPEN ANGLE GLAUCOMA (POAG) OF BOTH EYES, MODERATE STAGE: Primary | ICD-10-CM

## 2024-01-29 PROCEDURE — 99213 OFFICE O/P EST LOW 20 MIN: CPT | Performed by: OPHTHALMOLOGY

## 2024-01-29 RX ORDER — LATANOPROST 50 UG/ML
1 SOLUTION/ DROPS OPHTHALMIC EVERY EVENING
Qty: 2.5 ML | Refills: 11 | Status: SHIPPED | OUTPATIENT
Start: 2024-01-29 | End: 2024-06-10 | Stop reason: SDUPTHER

## 2024-01-29 RX ORDER — DORZOLAMIDE HYDROCHLORIDE AND TIMOLOL MALEATE 20; 5 MG/ML; MG/ML
1 SOLUTION/ DROPS OPHTHALMIC 2 TIMES DAILY
Qty: 10 ML | Refills: 3 | Status: SHIPPED | OUTPATIENT
Start: 2024-01-29 | End: 2024-06-10 | Stop reason: SDUPTHER

## 2024-01-29 ASSESSMENT — TONOMETRY
OD_IOP_MMHG: 15
IOP_METHOD: GOLDMANN APPLANATION
OS_IOP_MMHG: 14

## 2024-01-29 ASSESSMENT — REFRACTION_WEARINGRX
OD_AXIS: 094
OD_AXIS: 094
OD_SPHERE: +2.50
OS_SPHERE: -1.75
OS_SPHERE: +0.75
SPECS_TYPE: DISTANCE
OD_SPHERE: -0.00
SPECS_TYPE: READING
OD_CYLINDER: -0.75
OD_CYLINDER: -0.75

## 2024-01-29 ASSESSMENT — PACHYMETRY
OS_CT(UM): 552
OD_CT(UM): 558

## 2024-01-29 ASSESSMENT — VISUAL ACUITY
OS_CC+: +1
CORRECTION_TYPE: GLASSES
OD_CC+: -2
OS_CC: 20/40
OD_CC: 20/25
METHOD: SNELLEN - SINGLE

## 2024-01-29 ASSESSMENT — ENCOUNTER SYMPTOMS
CONSTITUTIONAL NEGATIVE: 0
OCCASIONAL FEELINGS OF UNSTEADINESS: 0
HEMATOLOGIC/LYMPHATIC NEGATIVE: 0
RESPIRATORY NEGATIVE: 0
EYES NEGATIVE: 0
LOSS OF SENSATION IN FEET: 0
PSYCHIATRIC NEGATIVE: 0
DEPRESSION: 0
NEUROLOGICAL NEGATIVE: 0
CARDIOVASCULAR NEGATIVE: 0
ALLERGIC/IMMUNOLOGIC NEGATIVE: 0
GASTROINTESTINAL NEGATIVE: 0
MUSCULOSKELETAL NEGATIVE: 0
ENDOCRINE NEGATIVE: 0

## 2024-01-29 ASSESSMENT — PAIN SCALES - GENERAL: PAINLEVEL: 0-NO PAIN

## 2024-01-29 ASSESSMENT — SLIT LAMP EXAM - LIDS
COMMENTS: NORMAL
COMMENTS: NORMAL

## 2024-01-29 ASSESSMENT — EXTERNAL EXAM - LEFT EYE: OS_EXAM: NORMAL

## 2024-01-29 ASSESSMENT — EXTERNAL EXAM - RIGHT EYE: OD_EXAM: NORMAL

## 2024-01-29 ASSESSMENT — PATIENT HEALTH QUESTIONNAIRE - PHQ9
SUM OF ALL RESPONSES TO PHQ9 QUESTIONS 1 AND 2: 0
2. FEELING DOWN, DEPRESSED OR HOPELESS: NOT AT ALL
1. LITTLE INTEREST OR PLEASURE IN DOING THINGS: NOT AT ALL

## 2024-01-29 NOTE — ASSESSMENT & PLAN NOTE
Stable IOP, slightly better than previous. Suspect continues to be at good level. Will continue to monitor with serial exam.

## 2024-01-29 NOTE — PATIENT INSTRUCTIONS
Thank you so much for choosing me to provide your care today!    If you were dilated your vision may remain blurry   or light sensitive for several hours.    The nature of eye and vision problems can require frequent follow up, please make every effort to adhere to any future appointments.    If you have any issues, questions, or concerns,   please do not hesitate to reach out.    If you receive a survey in regards to your care today, please mention any exceptional care my office staff and/or technicians provided.    You can reach our office at this number:  [phone]

## 2024-01-29 NOTE — PROGRESS NOTES
Assessment/Plan   Problem List Items Addressed This Visit          Eye/Vision problems    Nuclear senile cataract     Biggest component of vision change, no significant but will continue to monitor with serial exam.          Primary open angle glaucoma (POAG) of both eyes, moderate stage - Primary     Stable IOP, slightly better than previous. Suspect continues to be at good level. Will continue to monitor with serial exam.             Provided reassurance regarding above diagnoses and care received in the office visit today. Discussed outcomes and options along with the importance of treatment compliance. Understands the importance of any follow up visits. Patient instructed to call/communicate with our office if any new issues, questions, or concerns.     Will plan to see back in 4 months for full OCT nerve for sooner PRN

## 2024-01-29 NOTE — ASSESSMENT & PLAN NOTE
Biggest component of vision change, no significant but will continue to monitor with serial exam.

## 2024-02-08 DIAGNOSIS — Z01.89 ENCOUNTER FOR ROUTINE LABORATORY TESTING: Primary | ICD-10-CM

## 2024-02-08 DIAGNOSIS — R53.83 OTHER FATIGUE: ICD-10-CM

## 2024-02-08 DIAGNOSIS — E78.5 HYPERLIPIDEMIA, UNSPECIFIED HYPERLIPIDEMIA TYPE: ICD-10-CM

## 2024-02-08 DIAGNOSIS — Z85.72 HISTORY OF NON-HODGKIN'S LYMPHOMA: ICD-10-CM

## 2024-02-08 DIAGNOSIS — E03.8 SUBCLINICAL HYPOTHYROIDISM: ICD-10-CM

## 2024-02-08 DIAGNOSIS — R73.9 HYPERGLYCEMIA: ICD-10-CM

## 2024-02-08 DIAGNOSIS — E55.9 VITAMIN D DEFICIENCY: ICD-10-CM

## 2024-02-08 RX ORDER — IPRATROPIUM BROMIDE 42 UG/1
2 SPRAY, METERED NASAL 4 TIMES DAILY
Qty: 15 ML | Refills: 2 | Status: SHIPPED | OUTPATIENT
Start: 2024-02-08 | End: 2025-02-07

## 2024-02-24 ENCOUNTER — LAB (OUTPATIENT)
Dept: LAB | Facility: LAB | Age: 79
End: 2024-02-24
Payer: MEDICARE

## 2024-02-24 DIAGNOSIS — E55.9 VITAMIN D DEFICIENCY: ICD-10-CM

## 2024-02-24 DIAGNOSIS — R73.9 HYPERGLYCEMIA: ICD-10-CM

## 2024-02-24 DIAGNOSIS — Z01.89 ENCOUNTER FOR ROUTINE LABORATORY TESTING: ICD-10-CM

## 2024-02-24 DIAGNOSIS — E78.5 HYPERLIPIDEMIA, UNSPECIFIED HYPERLIPIDEMIA TYPE: ICD-10-CM

## 2024-02-24 DIAGNOSIS — Z85.72 HISTORY OF NON-HODGKIN'S LYMPHOMA: ICD-10-CM

## 2024-02-24 DIAGNOSIS — R53.83 OTHER FATIGUE: ICD-10-CM

## 2024-02-24 LAB
25(OH)D3 SERPL-MCNC: 24 NG/ML (ref 31–100)
ALBUMIN SERPL-MCNC: 4.3 G/DL (ref 3.5–5)
ALP BLD-CCNC: 107 U/L (ref 35–125)
ALT SERPL-CCNC: 22 U/L (ref 5–40)
ANION GAP SERPL CALC-SCNC: 9 MMOL/L
AST SERPL-CCNC: 29 U/L (ref 5–40)
BASOPHILS # BLD AUTO: 0.05 X10*3/UL (ref 0–0.1)
BASOPHILS NFR BLD AUTO: 0.9 %
BILIRUB SERPL-MCNC: 0.4 MG/DL (ref 0.1–1.2)
BUN SERPL-MCNC: 23 MG/DL (ref 8–25)
CALCIUM SERPL-MCNC: 9.4 MG/DL (ref 8.5–10.4)
CHLORIDE SERPL-SCNC: 106 MMOL/L (ref 97–107)
CHOLEST SERPL-MCNC: 235 MG/DL (ref 133–200)
CHOLEST/HDLC SERPL: 5.6 {RATIO}
CO2 SERPL-SCNC: 27 MMOL/L (ref 24–31)
CREAT SERPL-MCNC: 0.8 MG/DL (ref 0.4–1.6)
EGFRCR SERPLBLD CKD-EPI 2021: 76 ML/MIN/1.73M*2
EOSINOPHIL # BLD AUTO: 0.24 X10*3/UL (ref 0–0.4)
EOSINOPHIL NFR BLD AUTO: 4.2 %
ERYTHROCYTE [DISTWIDTH] IN BLOOD BY AUTOMATED COUNT: 12.9 % (ref 11.5–14.5)
EST. AVERAGE GLUCOSE BLD GHB EST-MCNC: 126 MG/DL
GLUCOSE SERPL-MCNC: 96 MG/DL (ref 65–99)
HBA1C MFR BLD: 6 %
HCT VFR BLD AUTO: 46.5 % (ref 36–46)
HDLC SERPL-MCNC: 42 MG/DL
HGB BLD-MCNC: 14.7 G/DL (ref 12–16)
IMM GRANULOCYTES # BLD AUTO: 0.01 X10*3/UL (ref 0–0.5)
IMM GRANULOCYTES NFR BLD AUTO: 0.2 % (ref 0–0.9)
LDLC SERPL CALC-MCNC: 156 MG/DL (ref 65–130)
LYMPHOCYTES # BLD AUTO: 1.65 X10*3/UL (ref 0.8–3)
LYMPHOCYTES NFR BLD AUTO: 28.8 %
MCH RBC QN AUTO: 29.2 PG (ref 26–34)
MCHC RBC AUTO-ENTMCNC: 31.6 G/DL (ref 32–36)
MCV RBC AUTO: 92 FL (ref 80–100)
MONOCYTES # BLD AUTO: 0.59 X10*3/UL (ref 0.05–0.8)
MONOCYTES NFR BLD AUTO: 10.3 %
NEUTROPHILS # BLD AUTO: 3.18 X10*3/UL (ref 1.6–5.5)
NEUTROPHILS NFR BLD AUTO: 55.6 %
NRBC BLD-RTO: 0 /100 WBCS (ref 0–0)
PLATELET # BLD AUTO: 239 X10*3/UL (ref 150–450)
POTASSIUM SERPL-SCNC: 4.5 MMOL/L (ref 3.4–5.1)
PROT SERPL-MCNC: 6.7 G/DL (ref 5.9–7.9)
RBC # BLD AUTO: 5.03 X10*6/UL (ref 4–5.2)
SODIUM SERPL-SCNC: 142 MMOL/L (ref 133–145)
TRIGL SERPL-MCNC: 183 MG/DL (ref 40–150)
TSH SERPL DL<=0.05 MIU/L-ACNC: 1.61 MIU/L (ref 0.27–4.2)
WBC # BLD AUTO: 5.7 X10*3/UL (ref 4.4–11.3)

## 2024-02-24 PROCEDURE — 84443 ASSAY THYROID STIM HORMONE: CPT

## 2024-02-24 PROCEDURE — 85025 COMPLETE CBC W/AUTO DIFF WBC: CPT

## 2024-02-24 PROCEDURE — 82306 VITAMIN D 25 HYDROXY: CPT

## 2024-02-24 PROCEDURE — 36415 COLL VENOUS BLD VENIPUNCTURE: CPT

## 2024-02-24 PROCEDURE — 80061 LIPID PANEL: CPT

## 2024-02-24 PROCEDURE — 83036 HEMOGLOBIN GLYCOSYLATED A1C: CPT

## 2024-02-24 PROCEDURE — 80053 COMPREHEN METABOLIC PANEL: CPT

## 2024-02-29 PROBLEM — Z85.9 HISTORY OF MALIGNANT NEOPLASM: Status: RESOLVED | Noted: 2024-02-29 | Resolved: 2024-02-29

## 2024-02-29 PROBLEM — N84.0 POLYP OF ENDOMETRIUM: Status: RESOLVED | Noted: 2024-01-21 | Resolved: 2024-02-29

## 2024-02-29 PROBLEM — J01.10 ACUTE FRONTAL SINUSITIS: Status: RESOLVED | Noted: 2024-02-29 | Resolved: 2024-02-29

## 2024-02-29 PROBLEM — R20.8 DYSESTHESIA: Status: RESOLVED | Noted: 2023-09-14 | Resolved: 2024-02-29

## 2024-02-29 PROBLEM — H40.1190 PRIMARY OPEN ANGLE GLAUCOMA (POAG): Status: RESOLVED | Noted: 2023-09-14 | Resolved: 2024-02-29

## 2024-02-29 PROBLEM — H61.20 IMPACTED CERUMEN: Status: RESOLVED | Noted: 2024-02-29 | Resolved: 2024-02-29

## 2024-02-29 ASSESSMENT — ENCOUNTER SYMPTOMS
FEVER: 0
CHILLS: 0
ABDOMINAL PAIN: 0
COUGH: 0
NAUSEA: 0
DIARRHEA: 0
APPETITE CHANGE: 0
HEADACHES: 0
SHORTNESS OF BREATH: 0
VOMITING: 0

## 2024-02-29 NOTE — PROGRESS NOTES
[facility]: [facility]  MEDICARE WELLNESS EXAM      Wendy Oswald is a 78 y.o. female that is presenting today for No chief complaint on file..    Assessment/Plan    Diagnoses and all orders for this visit:  Annual physical exam    ADVANCED CARE PLANNING  Advanced Care Planning was discussed with patient:  The patient has an active advanced care plan on file. The patient has an active surrogate decision-maker on file.  Encouraged the patient to confirm that Living Will and Healthcare Power of  (HCPoA) are accurate and up to date.  Encouraged the patient to confirm that our office be provided a copy of any documentation in the event that anything changes.    ACTIVITIES OF DAILY LIVING  Basic ADLs:  Bathing: Independent, Dressing: Independent, Toileting: Independent, Transferring: Independent, Continence: Independent, Feeding: Independent.    Instrumental ADLs:  Ability to use phone: Independent, Shopping: Independent, Cooking: Independent, House-keeping: Independent, Laundry: Independent, Transportation: Independent, Medication Management: Independent, Finance Management: Independent.    Subjective   HPI  This patient presents today for annual physical, Medicare wellness exam.  Discussed screening/prevention, healthy lifestyle and code status.   Reviewed the patient's wishing regarding decision making.    Review of Systems   Constitutional:  Negative for appetite change, chills and fever.   Respiratory:  Negative for cough and shortness of breath.    Cardiovascular:  Negative for chest pain.   Gastrointestinal:  Negative for abdominal pain, diarrhea, nausea and vomiting.   Neurological:  Negative for headaches.   All other systems reviewed and are negative.    Objective   There were no vitals filed for this visit.   There is no height or weight on file to calculate BMI.  Physical Exam  Vitals reviewed.   Constitutional:       General: She is not in acute distress.     Appearance: She  "is not toxic-appearing.   HENT:      Head: Normocephalic and atraumatic.      Mouth/Throat:      Mouth: Mucous membranes are moist.   Eyes:      Pupils: Pupils are equal, round, and reactive to light.   Cardiovascular:      Rate and Rhythm: Normal rate and regular rhythm.      Heart sounds: No murmur heard.  Pulmonary:      Breath sounds: Normal breath sounds. No wheezing, rhonchi or rales.   Abdominal:      General: There is no distension.      Palpations: Abdomen is soft.   Musculoskeletal:      Right lower leg: No edema.      Left lower leg: No edema.   Neurological:      General: No focal deficit present.      Mental Status: She is alert and oriented to person, place, and time.       Diagnostic Results   Lab Results   Component Value Date    GLUCOSE 96 02/24/2024    CALCIUM 9.4 02/24/2024     02/24/2024    K 4.5 02/24/2024    CO2 27 02/24/2024     02/24/2024    BUN 23 02/24/2024    CREATININE 0.80 02/24/2024     Lab Results   Component Value Date    ALT 22 02/24/2024    AST 29 02/24/2024    ALKPHOS 107 02/24/2024    BILITOT 0.4 02/24/2024     Lab Results   Component Value Date    WBC 5.7 02/24/2024    HGB 14.7 02/24/2024    HCT 46.5 (H) 02/24/2024    MCV 92 02/24/2024     02/24/2024     Lab Results   Component Value Date    CHOL 235 (H) 02/24/2024    CHOL 227 (H) 10/18/2022    CHOL 208 (H) 11/15/2019     Lab Results   Component Value Date    HDL 42.0 (L) 02/24/2024    HDL 46 (L) 10/18/2022    HDL 66 11/15/2019     Lab Results   Component Value Date    LDLCALC 156 (H) 02/24/2024    LDLCALC 144 (H) 10/18/2022    LDLCALC 125 11/15/2019     Lab Results   Component Value Date    TRIG 183 (H) 02/24/2024    TRIG 187 (H) 10/18/2022    TRIG 85 11/15/2019     No components found for: \"CHOLHDL\"  Lab Results   Component Value Date    HGBA1C 6.0 (H) 02/24/2024     Other labs not included in the list above reviewed either before or during this encounter.    History   Past Medical History:   Diagnosis Date "    Abnormal abdominal ultrasound     Acute frontal sinusitis 2024    Allergic rhinitis     Anxiety     Dry eye syndrome of bilateral lacrimal glands     Dysesthesia 2023    History of malignant neoplasm 2024    History of non-Hodgkin's lymphoma     Impacted cerumen 2024    Nuclear senile cataract     Personal history of malignant neoplasm, unspecified     History of malignant neoplasm    Polyp of endometrium 2024    Postmenopausal bleeding     Primary open angle glaucoma (POAG) 2023    Primary open angle glaucoma of both eyes, moderate stage     Primary open-angle glaucoma, bilateral, moderate stage     Pure hypercholesterolemia     Sinusitis     Subclinical hypothyroidism     Unspecified disorder of refraction      Past Surgical History:   Procedure Laterality Date    ANKLE ARTHROPLASTY      DILATION AND CURETTAGE OF UTERUS      OTHER SURGICAL HISTORY  2022    Intestinal surgery    TONSILLECTOMY       Family History   Problem Relation Name Age of Onset    Cervical cancer Mother      Tuberculosis Mother      No Known Problems Father      No Known Problems Brother      No Known Problems Daughter      No Known Problems Son      Cervical cancer Mother's Sister      Stroke Mother's Sister      Cervical cancer Father's Sister      Stroke Father's Sister      Hypertension Father's Sister      Lung cancer Maternal Grandmother      Heart attack Maternal Grandfather      Heart disease Maternal Grandfather      No Known Problems Sibling       Social History     Socioeconomic History    Marital status:      Spouse name: Not on file    Number of children: Not on file    Years of education: Not on file    Highest education level: Not on file   Occupational History    Not on file   Tobacco Use    Smoking status: Former     Years: 3     Types: Cigarettes     Quit date:      Years since quittin.1    Smokeless tobacco: Never   Vaping Use    Vaping Use: Never used   Substance  and Sexual Activity    Alcohol use: Yes     Alcohol/week: 2.0 standard drinks of alcohol     Types: 2 Glasses of wine per week    Drug use: Never    Sexual activity: Not Currently   Other Topics Concern    Not on file   Social History Narrative    Not on file     Social Determinants of Health     Financial Resource Strain: Not on file   Food Insecurity: Not on file   Transportation Needs: Not on file   Physical Activity: Not on file   Stress: Not on file   Social Connections: Not on file   Intimate Partner Violence: Not on file   Housing Stability: Not on file     Allergies   Allergen Reactions    Sulfa (Sulfonamide Antibiotics) Nausea/vomiting    House Dust Other     SNEEZING     Current Outpatient Medications on File Prior to Visit   Medication Sig Dispense Refill    acetaminophen (Tylenol) 500 mg tablet Take 2 tablets (1,000 mg) by mouth every 6 hours if needed for mild pain (1 - 3).      dorzolamide-timoloL (Cosopt) 22.3-6.8 mg/mL ophthalmic solution Administer 1 drop into both eyes 2 times a day. 10 mL 3    ipratropium (Atrovent) 42 mcg (0.06 %) nasal spray Administer 2 sprays into each nostril 4 times a day. 15 mL 2    latanoprost (Xalatan) 0.005 % ophthalmic solution Administer 1 drop into both eyes once daily in the evening. 2.5 mL 11     No current facility-administered medications on file prior to visit.     Immunization History   Administered Date(s) Administered    Flu vaccine, quadrivalent, high-dose, preservative free, age 65y+ (FLUZONE) 09/19/2022    Influenza, High Dose Seasonal, Preservative Free 11/18/2015, 10/01/2019    Influenza, seasonal, injectable 10/05/2017, 10/02/2018, 10/15/2019, 09/15/2020, 09/30/2021    Pfizer COVID-19 vaccine, bivalent, age 12 years and older (30 mcg/0.3 mL) 10/03/2022    Pfizer Purple Cap SARS-CoV-2 02/28/2021, 03/21/2021, 03/31/2021, 10/08/2021, 04/07/2022    Pneumococcal conjugate vaccine, 13-valent (PREVNAR 13) 11/20/2019     Patient's medical history was reviewed  and updated either before or during this encounter.     Aayush Kirkland MD

## 2024-03-01 ENCOUNTER — OFFICE VISIT (OUTPATIENT)
Dept: PRIMARY CARE | Facility: CLINIC | Age: 79
End: 2024-03-01
Payer: MEDICARE

## 2024-03-01 DIAGNOSIS — E78.1 PURE HYPERTRIGLYCERIDEMIA: ICD-10-CM

## 2024-03-01 DIAGNOSIS — Z00.00 ANNUAL PHYSICAL EXAM: Primary | ICD-10-CM

## 2024-03-01 DIAGNOSIS — R79.89 LOW VITAMIN D LEVEL: ICD-10-CM

## 2024-03-01 DIAGNOSIS — R73.01 IFG (IMPAIRED FASTING GLUCOSE): ICD-10-CM

## 2024-03-20 ENCOUNTER — LAB (OUTPATIENT)
Dept: LAB | Facility: CLINIC | Age: 79
End: 2024-03-20
Payer: MEDICARE

## 2024-03-20 ENCOUNTER — OFFICE VISIT (OUTPATIENT)
Dept: HEMATOLOGY/ONCOLOGY | Facility: CLINIC | Age: 79
End: 2024-03-20
Payer: MEDICARE

## 2024-03-20 VITALS
DIASTOLIC BLOOD PRESSURE: 76 MMHG | HEART RATE: 67 BPM | WEIGHT: 172.07 LBS | SYSTOLIC BLOOD PRESSURE: 138 MMHG | BODY MASS INDEX: 29.54 KG/M2 | TEMPERATURE: 97.7 F | RESPIRATION RATE: 18 BRPM | OXYGEN SATURATION: 97 %

## 2024-03-20 DIAGNOSIS — Z85.72 HISTORY OF NON-HODGKIN'S LYMPHOMA: ICD-10-CM

## 2024-03-20 DIAGNOSIS — Z85.72 HISTORY OF NON-HODGKIN'S LYMPHOMA: Primary | ICD-10-CM

## 2024-03-20 DIAGNOSIS — C82.53 DIFFUSE FOLLICLE CENTER LYMPHOMA OF INTRA-ABDOMINAL LYMPH NODES (MULTI): ICD-10-CM

## 2024-03-20 LAB — LDH SERPL L TO P-CCNC: 180 U/L (ref 84–246)

## 2024-03-20 PROCEDURE — 1160F RVW MEDS BY RX/DR IN RCRD: CPT | Performed by: NURSE PRACTITIONER

## 2024-03-20 PROCEDURE — 83615 LACTATE (LD) (LDH) ENZYME: CPT

## 2024-03-20 PROCEDURE — 99213 OFFICE O/P EST LOW 20 MIN: CPT | Performed by: NURSE PRACTITIONER

## 2024-03-20 PROCEDURE — 1036F TOBACCO NON-USER: CPT | Performed by: NURSE PRACTITIONER

## 2024-03-20 PROCEDURE — 1159F MED LIST DOCD IN RCRD: CPT | Performed by: NURSE PRACTITIONER

## 2024-03-20 PROCEDURE — 1126F AMNT PAIN NOTED NONE PRSNT: CPT | Performed by: NURSE PRACTITIONER

## 2024-03-20 PROCEDURE — 36415 COLL VENOUS BLD VENIPUNCTURE: CPT

## 2024-03-20 ASSESSMENT — PAIN SCALES - GENERAL: PAINLEVEL: 0-NO PAIN

## 2024-03-20 ASSESSMENT — PATIENT HEALTH QUESTIONNAIRE - PHQ9
2. FEELING DOWN, DEPRESSED OR HOPELESS: NOT AT ALL
1. LITTLE INTEREST OR PLEASURE IN DOING THINGS: NOT AT ALL
SUM OF ALL RESPONSES TO PHQ9 QUESTIONS 1 AND 2: 0

## 2024-03-20 ASSESSMENT — COLUMBIA-SUICIDE SEVERITY RATING SCALE - C-SSRS
6. HAVE YOU EVER DONE ANYTHING, STARTED TO DO ANYTHING, OR PREPARED TO DO ANYTHING TO END YOUR LIFE?: NO
1. IN THE PAST MONTH, HAVE YOU WISHED YOU WERE DEAD OR WISHED YOU COULD GO TO SLEEP AND NOT WAKE UP?: NO
2. HAVE YOU ACTUALLY HAD ANY THOUGHTS OF KILLING YOURSELF?: NO

## 2024-03-20 NOTE — PROGRESS NOTES
Patient ID: Wendy Oswald is a 78 y.o. female.    Cancer History:  Non-Hodgkin's, stage 4     History of Present Illness:  In 2007 she had presented with small bowel perforation. 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  After surgery she received one cycle of Rituxan without any problems. She had a PET-CT then that showed several foci of activity corresponding to the right common iliac, left iliac, terminal iliac and bilateral inguinal lymph nodes. There was moderate hypermetabolic activity in the lower mesenteric root at the level of the aortic bifurcation. The largest lymph node measured about 2.5 cm. There was also  a left external iliac node hypermetabolic along with two small foci in the upper medial aspect of the bilateral iliac just above the sacroiliac joints.    She did well until 2009 when there was an uptake in the terminal ileum. Attempts at biopsy were not diagnostic. At that point she was re-treated with one course of Rituxan followed by maintenance Rituxan for which she completed two years of therapy.    Interval History:  Patient presents today for routine 6-month follow-up evaluation for history of non-Hodgkin's lymphoma.  On presentation today patient is doing very well she has no complaints.  She denies any fever, chills or drenching night sweats.  No cough, chest pain or shortness of breath.  No nausea or vomiting.  No constipation or diarrhea.  Her appetite is good. She is gaining weight (stress eating).    There was concern in March of 2022 when her LDH had risen to 254 CT scan was completed and found to be negative.  Most recent LDH, September 2022 was found to be 212.  At our last visit LDH found to be 273 September therefore CT scan pursued. No lymphadenopathy.  However, asymmetric hypodensity demonstrated within the region of the endometrial canal. Followed up with Dr. German in gynecology. Faheem  completed, negative pathology.   No further images are planned unless LDH is again found to be significantly elevated.    Review of Systems:  A review of systems has been completed and are negative for complaints except what is stated in the HPI and/or past medical history    Allergies:  sulfa drugs     Medications:  Medication list reviewed with patient and updated in EMR    Past Medical History:    Family History:  Cervical cancer in her mother and an aunt  Lung cancer - grandmother    Social History:   Lives with spouse  10-12 year tobacco exposure  Worked in office setting.(1)    Vital Signs:  /76 (BP Location: Left arm, Patient Position: Sitting, BP Cuff Size: Adult long)   Pulse 67   Temp 36.5 °C (97.7 °F) (Temporal)   Resp 18   Wt 78 kg (172 lb 1.1 oz)   SpO2 97%   BMI 29.54 kg/m²     Physical Exam:  ECO  Pain: 0  Constitutional: Well developed, awake/alert/oriented x3, no distress, alert and cooperative  Eyes: PER. sclera anicteric  ENMT: Oral mucosa moist  Respiratory/Thorax: Breathing is non-labored. Lungs are clear to auscultation bilaterally. No adventitious breath sounds  Cardiovascular: S1-S2. Regular rate and rhythm. No murmurs, rubs, or gallops appreciated  Gastrointestinal: Abdomen soft nontender, nondistended, normal active bowel sounds.   Musculoskeletal: ROM intact, no joint swelling, normal strength  Extremities: normal extremities, no cyanosis, no edema, no clubbing  Neurologic: alert and oriented x3. Nonfocal exam. No myoclonus  Psychological: Pleasant, appropriate and easily engaged     Lab Results:  2024  WBC 5.7, hemoglobin 14.7, hematocrit 46.5, platelets 239,000    2024      CBC date/time       WBC     HGB     HCT     PLT     Neut      20-Sep-2023 10:40   6.8     15.0    46.0    217     4.18  16-Mar-2023 09:43   5.9     14.8    44.4    209     3.42  02-Sep-2022 10:35   6.5     14.9    45.0    213     3.98    LDH date/time       LDH      02-Sep-2022 10:35   212  25-Mar-2022 11:07   254(H)    Radiology Result:  Interpreted By:  Carson Rivas,   STUDY:  CT CHEST ABDOMEN PELVIS WITH AND WITHOUT IV CONTRAST; 10/2/2023 3:34  pm      INDICATION:  Signs/Symptoms:Diffuse follicle center lymphoma of intra-abdominal  lymph nodes.      COMPARISON:  04/05/2022.      ACCESSION NUMBER(S):  [id number]      ORDERING CLINICIAN:  SPENCER HARLEY      TECHNIQUE:  CT axial images through the chest, abdomen, and pelvis with  intravenous contrast, 75 ml of Omnipaque 350 was administered. Oral  contrast was not administered. Post processing sagittal and coronal  reconstruction images were also performed.      FINDINGS:  Chest:      Mediastinum demonstrates no lymphadenopathy. Scattered subcentimeter  lymph nodes. There is hilar lymphadenopathy. Esophagus is  unremarkable. Small paraesophageal hernia as seen on prior imaging.      Heart and great vessels demonstrate ascending thoracic aorta to  measure 3.2 cm. There is mild vascular calcification of the aortic  arch. Main pulmonary artery is unremarkable. No cardiomegaly  demonstrated.      Lung parenchyma demonstrates scattered calcified granulomas. No  infiltrate or effusion identified. No noncalcified pulmonary nodules  demonstrated.      Visualized osseous structures demonstrates mild anterior osteophyte  formation in the midthoracic spine. No compression deformity  demonstrated.      Abdomen:      Liver is unremarkable.      Spleen is unremarkable.      Adrenal glands are unremarkable      Gallbladder demonstrates Phrygian cap.      Pancreas is unremarkable.      Right kidney is unremarkable.      Left kidney is unremarkable.      Demonstrates no lymphadenopathy. Scattered shotty subcentimeter lymph  nodes as seen on prior imaging. There is no ascites      Loops of large bowel demonstrate uncomplicated sigmoid and descending  colon diverticulosis. Transverse and descending colon are contracted  limiting  characterization. No pericolonic fat stranding demonstrated.  Small bowel loops are nondilated. There is mild stool filled loops of  distal small bowel nonspecific. Appendix is seen and is unremarkable.  Postsurgical changes are demonstrated with enteroenteric anastomosis  in the midline lower abdomen as seen on prior imaging. Minimal  stranding of the adjacent perienteric fat stable from prior imaging.  There is no lymphadenopathy. Stomach is contracted.      CT pelvis:      Unopacified bladder is unremarkable. There is no pelvic  lymphadenopathy. No free fluid demonstrated. Uterus and adnexa  demonstrate asymmetric hypodensity asymmetric hypodensity within the  region of the endometrial canal not seen on prior imaging unclear if  this relates to endometrial thickening measuring 10 mm. Follow-up  with gynecology and dedicated ultrasound of the pelvis recommended.  Adnexa are unremarkable.      Visualized osseous structures demonstrate mild facet arthropathy  lower lumbar spine. No compression deformity in the lumbar spine.  Mild endplate degenerative changes are demonstrated. No significant  narrowing thecal sac        IMPRESSION:  1. No lymphadenopathy in the chest, abdomen, or pelvis.      2. Asymmetric hypodensity demonstrated within the region of the  endometrial canal not seen on prior imaging with further workup  recommended and follow-up with gynecology.      3. Enteroenteric anastomosis in the midline lower abdomen with  findings appearing stable with mild stranding of the perienteric fat  stable from prior imaging.      4. Uncomplicated descending and sigmoid diverticulosis.      Assessment:   Non-Hodgkins lymphoma, follicular abdomen:  - No current evidence of disease recurrence.    - LDH from today is within normal range.    - CBC with all cell lines within normal limits.    - Patient is asymptomatic and specifically denies drenching night sweats, unexplained weight loss or lymphadenopathy.  Physical  exam today was unremarkable.    Health Maintenance:  - up to date mammogram and colonoscopy (no further planned)    Plan:  - 6 month follow-up  - lab on return CBCd CMP LDH            Kj Pinto, APRN-CNP

## 2024-06-10 ENCOUNTER — OFFICE VISIT (OUTPATIENT)
Dept: OPHTHALMOLOGY | Facility: CLINIC | Age: 79
End: 2024-06-10
Payer: MEDICARE

## 2024-06-10 DIAGNOSIS — H52.7 UNSPECIFIED DISORDER OF REFRACTION: ICD-10-CM

## 2024-06-10 DIAGNOSIS — H25.813 COMBINED FORMS OF AGE-RELATED CATARACT OF BOTH EYES: ICD-10-CM

## 2024-06-10 DIAGNOSIS — H40.1132 PRIMARY OPEN ANGLE GLAUCOMA (POAG) OF BOTH EYES, MODERATE STAGE: Primary | ICD-10-CM

## 2024-06-10 DIAGNOSIS — H04.123 INSUFFICIENCY OF TEAR FILM OF BOTH EYES: ICD-10-CM

## 2024-06-10 PROCEDURE — 92133 CPTRZD OPH DX IMG PST SGM ON: CPT | Performed by: OPHTHALMOLOGY

## 2024-06-10 PROCEDURE — 92015 DETERMINE REFRACTIVE STATE: CPT | Performed by: OPHTHALMOLOGY

## 2024-06-10 PROCEDURE — 99214 OFFICE O/P EST MOD 30 MIN: CPT | Performed by: OPHTHALMOLOGY

## 2024-06-10 RX ORDER — DORZOLAMIDE HYDROCHLORIDE AND TIMOLOL MALEATE 20; 5 MG/ML; MG/ML
1 SOLUTION/ DROPS OPHTHALMIC 2 TIMES DAILY
Qty: 10 ML | Refills: 3 | Status: SHIPPED | OUTPATIENT
Start: 2024-06-10

## 2024-06-10 RX ORDER — LATANOPROST 50 UG/ML
1 SOLUTION/ DROPS OPHTHALMIC EVERY EVENING
Qty: 2.5 ML | Refills: 11 | Status: SHIPPED | OUTPATIENT
Start: 2024-06-10

## 2024-06-10 ASSESSMENT — ENCOUNTER SYMPTOMS
HEMATOLOGIC/LYMPHATIC NEGATIVE: 0
CARDIOVASCULAR NEGATIVE: 0
ALLERGIC/IMMUNOLOGIC NEGATIVE: 0
NEUROLOGICAL NEGATIVE: 0
PSYCHIATRIC NEGATIVE: 0
RESPIRATORY NEGATIVE: 0
MUSCULOSKELETAL NEGATIVE: 0
CONSTITUTIONAL NEGATIVE: 0
GASTROINTESTINAL NEGATIVE: 0
EYES NEGATIVE: 0
ENDOCRINE NEGATIVE: 0

## 2024-06-10 ASSESSMENT — REFRACTION_MANIFEST
METHOD_AUTOREFRACTION: 1
OD_AXIS: 095
OD_CYLINDER: -0.75
OS_CYLINDER: -0.75
OS_SPHERE: -0.75
OD_SPHERE: +0.50
OS_AXIS: 110

## 2024-06-10 ASSESSMENT — PACHYMETRY
OS_CT(UM): 552
OD_CT(UM): 558

## 2024-06-10 ASSESSMENT — REFRACTION_WEARINGRX
SPECS_TYPE: READERS
OD_SPHERE: -0.00
SPECS_TYPE: DISTANCE
OS_SPHERE: -1.75
OS_SPHERE: +0.75
OD_CYLINDER: -0.75
OD_SPHERE: +2.50
OD_AXIS: 095
OD_CYLINDER: -0.75
OD_AXIS: 094

## 2024-06-10 ASSESSMENT — KERATOMETRY
OS_AXISANGLE_DEGREES: 90
OD_AXISANGLE2_DEGREES: 180
OS_K2POWER_DIOPTERS: 45.00
OD_AXISANGLE_DEGREES: 90
OD_K2POWER_DIOPTERS: 45.00
OS_K1POWER_DIOPTERS: 45.00
OS_AXISANGLE2_DEGREES: 180
METHOD_AUTO_MANUAL: AUTOMATED
OD_K1POWER_DIOPTERS: 45.00

## 2024-06-10 ASSESSMENT — TONOMETRY
IOP_METHOD: GOLDMANN APPLANATION
OS_IOP_MMHG: 15
OD_IOP_MMHG: 15

## 2024-06-10 ASSESSMENT — PATIENT HEALTH QUESTIONNAIRE - PHQ9
1. LITTLE INTEREST OR PLEASURE IN DOING THINGS: NOT AT ALL
2. FEELING DOWN, DEPRESSED OR HOPELESS: NOT AT ALL
SUM OF ALL RESPONSES TO PHQ9 QUESTIONS 1 AND 2: 0

## 2024-06-10 ASSESSMENT — SLIT LAMP EXAM - LIDS
COMMENTS: NORMAL
COMMENTS: NORMAL

## 2024-06-10 ASSESSMENT — VISUAL ACUITY
OS_CC: 20/25
OS_CC+: -2
OD_CC+: -1
METHOD: SNELLEN - SINGLE
OD_CC: 20/30
CORRECTION_TYPE: GLASSES

## 2024-06-10 ASSESSMENT — EXTERNAL EXAM - RIGHT EYE: OD_EXAM: NORMAL

## 2024-06-10 ASSESSMENT — CUP TO DISC RATIO
OD_RATIO: 0.7
OS_RATIO: 0.5

## 2024-06-10 ASSESSMENT — PAIN SCALES - GENERAL: PAINLEVEL: 0-NO PAIN

## 2024-06-10 ASSESSMENT — EXTERNAL EXAM - LEFT EYE: OS_EXAM: NORMAL

## 2024-06-10 NOTE — ASSESSMENT & PLAN NOTE
IOP continues to run at a good level on latanoprost and cosopt. OCT nerve remains unchanged. Will proceed with same treatment. Llanos due next visit.

## 2024-06-10 NOTE — PATIENT INSTRUCTIONS
Thank you so much for choosing me to provide your care today!    If you were dilated your vision may remain blurry   or light sensitive for several hours.    The nature of eye and vision problems can require frequent follow up, please make every effort to adhere to any future appointments.    If you have any issues, questions, or concerns,   please do not hesitate to reach out.    If you receive a survey in regards to your care today, please mention any exceptional care my office staff and/or technicians provided.    You can reach our office at this number:  [phone]

## 2024-06-10 NOTE — PROGRESS NOTES
Assessment/Plan   Problem List Items Addressed This Visit       Combined forms of age-related cataract of both eyes     Non significant cataract noted on exam. Will plan to continue to monitor with serial exam.            Primary open angle glaucoma (POAG) of both eyes, moderate stage - Primary     IOP continues to run at a good level on latanoprost and cosopt. OCT nerve remains unchanged. Will proceed with same treatment. Llanos due next visit.          Relevant Medications    dorzolamide-timoloL (Cosopt) 22.3-6.8 mg/mL ophthalmic solution    latanoprost (Xalatan) 0.005 % ophthalmic solution    Tear film insufficiency     Advised on regular lubrication         Unspecified disorder of refraction     Discussed glasses prescription from refraction. Will provide if patient interested in keeping for records or to fill as a new set of glasses.               Provided reassurance regarding above diagnoses and care received in the office visit today. Discussed outcomes and options along with the importance of treatment compliance. Understands the importance of any follow up visits. Patient instructed to call/communicate with our office if any new issues, questions, or concerns.     Will plan to see back in 4 months pressure check and field testing or sooner PRN

## 2024-08-21 ENCOUNTER — APPOINTMENT (OUTPATIENT)
Dept: PRIMARY CARE | Facility: CLINIC | Age: 79
End: 2024-08-21
Payer: MEDICARE

## 2024-09-11 ENCOUNTER — OFFICE VISIT (OUTPATIENT)
Dept: HEMATOLOGY/ONCOLOGY | Facility: CLINIC | Age: 79
End: 2024-09-11
Payer: MEDICARE

## 2024-09-11 ENCOUNTER — LAB (OUTPATIENT)
Dept: LAB | Facility: CLINIC | Age: 79
End: 2024-09-11
Payer: MEDICARE

## 2024-09-11 VITALS
BODY MASS INDEX: 31.05 KG/M2 | HEART RATE: 58 BPM | RESPIRATION RATE: 18 BRPM | HEIGHT: 63 IN | DIASTOLIC BLOOD PRESSURE: 82 MMHG | SYSTOLIC BLOOD PRESSURE: 140 MMHG | TEMPERATURE: 98.7 F | OXYGEN SATURATION: 94 % | WEIGHT: 175.27 LBS

## 2024-09-11 DIAGNOSIS — Z85.72 HISTORY OF NON-HODGKIN'S LYMPHOMA: Primary | ICD-10-CM

## 2024-09-11 DIAGNOSIS — Z85.72 HISTORY OF NON-HODGKIN'S LYMPHOMA: ICD-10-CM

## 2024-09-11 LAB
ALBUMIN SERPL BCP-MCNC: 4.1 G/DL (ref 3.4–5)
ALP SERPL-CCNC: 71 U/L (ref 33–136)
ALT SERPL W P-5'-P-CCNC: 20 U/L (ref 7–45)
ANION GAP SERPL CALC-SCNC: 13 MMOL/L (ref 10–20)
AST SERPL W P-5'-P-CCNC: 26 U/L (ref 9–39)
BASOPHILS # BLD AUTO: 0.03 X10*3/UL (ref 0–0.1)
BASOPHILS NFR BLD AUTO: 0.5 %
BILIRUB SERPL-MCNC: 0.4 MG/DL (ref 0–1.2)
BUN SERPL-MCNC: 30 MG/DL (ref 6–23)
CALCIUM SERPL-MCNC: 9.4 MG/DL (ref 8.6–10.3)
CHLORIDE SERPL-SCNC: 110 MMOL/L (ref 98–107)
CO2 SERPL-SCNC: 25 MMOL/L (ref 21–32)
CREAT SERPL-MCNC: 0.86 MG/DL (ref 0.5–1.05)
EGFRCR SERPLBLD CKD-EPI 2021: 70 ML/MIN/1.73M*2
EOSINOPHIL # BLD AUTO: 0.12 X10*3/UL (ref 0–0.4)
EOSINOPHIL NFR BLD AUTO: 2 %
ERYTHROCYTE [DISTWIDTH] IN BLOOD BY AUTOMATED COUNT: 13.1 % (ref 11.5–14.5)
GLUCOSE SERPL-MCNC: 98 MG/DL (ref 74–99)
HCT VFR BLD AUTO: 43.3 % (ref 36–46)
HGB BLD-MCNC: 14.3 G/DL (ref 12–16)
IMM GRANULOCYTES # BLD AUTO: 0.01 X10*3/UL (ref 0–0.5)
IMM GRANULOCYTES NFR BLD AUTO: 0.2 % (ref 0–0.9)
LDH SERPL L TO P-CCNC: 187 U/L (ref 84–246)
LYMPHOCYTES # BLD AUTO: 1.95 X10*3/UL (ref 0.8–3)
LYMPHOCYTES NFR BLD AUTO: 31.8 %
MCH RBC QN AUTO: 30.2 PG (ref 26–34)
MCHC RBC AUTO-ENTMCNC: 33 G/DL (ref 32–36)
MCV RBC AUTO: 92 FL (ref 80–100)
MONOCYTES # BLD AUTO: 0.47 X10*3/UL (ref 0.05–0.8)
MONOCYTES NFR BLD AUTO: 7.7 %
NEUTROPHILS # BLD AUTO: 3.55 X10*3/UL (ref 1.6–5.5)
NEUTROPHILS NFR BLD AUTO: 57.8 %
NRBC BLD-RTO: 0 /100 WBCS (ref 0–0)
PLATELET # BLD AUTO: 201 X10*3/UL (ref 150–450)
POTASSIUM SERPL-SCNC: 3.9 MMOL/L (ref 3.5–5.3)
PROT SERPL-MCNC: 6.7 G/DL (ref 6.4–8.2)
RBC # BLD AUTO: 4.73 X10*6/UL (ref 4–5.2)
SODIUM SERPL-SCNC: 144 MMOL/L (ref 136–145)
WBC # BLD AUTO: 6.1 X10*3/UL (ref 4.4–11.3)

## 2024-09-11 PROCEDURE — 80053 COMPREHEN METABOLIC PANEL: CPT

## 2024-09-11 PROCEDURE — 99213 OFFICE O/P EST LOW 20 MIN: CPT | Performed by: NURSE PRACTITIONER

## 2024-09-11 PROCEDURE — 1126F AMNT PAIN NOTED NONE PRSNT: CPT | Performed by: NURSE PRACTITIONER

## 2024-09-11 PROCEDURE — 36415 COLL VENOUS BLD VENIPUNCTURE: CPT

## 2024-09-11 PROCEDURE — 85025 COMPLETE CBC W/AUTO DIFF WBC: CPT

## 2024-09-11 PROCEDURE — 1159F MED LIST DOCD IN RCRD: CPT | Performed by: NURSE PRACTITIONER

## 2024-09-11 PROCEDURE — 83615 LACTATE (LD) (LDH) ENZYME: CPT | Mod: WESLAB

## 2024-09-11 ASSESSMENT — PAIN SCALES - GENERAL: PAINLEVEL: 0-NO PAIN

## 2024-09-11 ASSESSMENT — COLUMBIA-SUICIDE SEVERITY RATING SCALE - C-SSRS
1. IN THE PAST MONTH, HAVE YOU WISHED YOU WERE DEAD OR WISHED YOU COULD GO TO SLEEP AND NOT WAKE UP?: NO
6. HAVE YOU EVER DONE ANYTHING, STARTED TO DO ANYTHING, OR PREPARED TO DO ANYTHING TO END YOUR LIFE?: NO
2. HAVE YOU ACTUALLY HAD ANY THOUGHTS OF KILLING YOURSELF?: NO

## 2024-09-11 NOTE — PROGRESS NOTES
Patient ID: Wendy Oswald is a 78 y.o. female.    Cancer History:  Non-Hodgkin's, stage 4     History of Present Illness:  In 2007 she had presented with small bowel perforation. 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  After surgery she received one cycle of Rituxan without any problems. She had a PET-CT then that showed several foci of activity corresponding to the right common iliac, left iliac, terminal iliac and bilateral inguinal lymph nodes. There was moderate hypermetabolic activity in the lower mesenteric root at the level of the aortic bifurcation. The largest lymph node measured about 2.5 cm. There was also  a left external iliac node hypermetabolic along with two small foci in the upper medial aspect of the bilateral iliac just above the sacroiliac joints.    She did well until 2009 when there was an uptake in the terminal ileum. Attempts at biopsy were not diagnostic. At that point she was re-treated with one course of Rituxan followed by maintenance Rituxan for which she completed two years of therapy.    Interval History:  Patient presents today for routine 6-month follow-up evaluation for history of non-Hodgkin's lymphoma.  On presentation today patient is doing very well she has no complaints.  She denies any fever, chills or drenching night sweats.  No cough, chest pain or shortness of breath.  No nausea or vomiting.  No constipation or diarrhea.  Her appetite is good. She is gaining weight (stress eating).    There was concern in March of 2022 when her LDH had risen to 254 CT scan was completed and found to be negative.  Most recent LDH, September 2022 was found to be 212.  At our last visit LDH found to be 273 September therefore CT scan pursued. No lymphadenopathy.  However, asymmetric hypodensity demonstrated within the region of the endometrial canal. Followed up with Dr. German in gynecology. Faheem  "completed, negative pathology.   No further images are planned unless LDH is again found to be significantly elevated.    2024:  Patient presents today for routine 6-month follow-up evaluation for history of non-Hodgkin's lymphoma on observation.  On presentation today patient is doing very well she has no complaints.  She denies any fever, chills or drenching night sweats.  No cough, chest pain or shortness of breath.  No nausea or vomiting.  No constipation or diarrhea. Her appetite is good.     Review of Systems:  A review of systems has been completed and are negative for complaints except what is stated in the HPI and/or past medical history    Allergies:  sulfa drugs     Medications:  Medication list reviewed with patient and updated in EMR    Past Medical History:  NHL,  -endometrial polyp, vitamin D deficiency, hypocholesterolemia     Family History:  Cervical cancer in her mother and an aunt  Lung cancer - grandmother    Social History:   Lives with spouse  10-12 year tobacco exposure  Worked in office setting.(1)    Vital Signs:  /82 (BP Location: Left arm, Patient Position: Sitting, BP Cuff Size: Adult long)   Pulse 58   Temp 37.1 °C (98.7 °F) (Temporal)   Resp 18   Ht (S) 1.609 m (5' 3.35\")   Wt 79.5 kg (175 lb 4.3 oz)   SpO2 94%   BMI 30.71 kg/m²     Physical Exam:  ECO  Pain: 0  Constitutional: Well developed, awake/alert/oriented x3, no distress, alert and cooperative  Eyes: PER. sclera anicteric  ENMT: Oral mucosa moist  Respiratory/Thorax: Breathing is non-labored. Lungs are clear to auscultation bilaterally. No adventitious breath sounds  Cardiovascular: S1-S2. Regular rate and rhythm. No murmurs, rubs, or gallops appreciated  Gastrointestinal: Abdomen soft nontender, nondistended, normal active bowel sounds.   Musculoskeletal: ROM intact, no joint swelling, normal strength  Extremities: normal extremities, no cyanosis, no edema, no clubbing  Neurologic: alert and " oriented x3. Nonfocal exam. No myoclonus  Psychological: Pleasant, appropriate and easily engaged     Lab Results:  February 24, 2024  WBC 5.7, hemoglobin 14.7, hematocrit 46.5, platelets 239,000    September 11, 2024  WBC 6.1, hemoglobin 14.3, hematocrit 43.3, platelets 201,000    Radiology Result:  Interpreted By:  Carson Rivas,   STUDY:  CT CHEST ABDOMEN PELVIS WITH AND WITHOUT IV CONTRAST; 10/2/2023 3:34  pm      INDICATION:  Signs/Symptoms:Diffuse follicle center lymphoma of intra-abdominal  lymph nodes.      COMPARISON:  04/05/2022.      ACCESSION NUMBER(S):  [id number]      ORDERING CLINICIAN:  SPENCER HARLEY      TECHNIQUE:  CT axial images through the chest, abdomen, and pelvis with  intravenous contrast, 75 ml of Omnipaque 350 was administered. Oral  contrast was not administered. Post processing sagittal and coronal  reconstruction images were also performed.      FINDINGS:  Chest:      Mediastinum demonstrates no lymphadenopathy. Scattered subcentimeter  lymph nodes. There is hilar lymphadenopathy. Esophagus is  unremarkable. Small paraesophageal hernia as seen on prior imaging.      Heart and great vessels demonstrate ascending thoracic aorta to  measure 3.2 cm. There is mild vascular calcification of the aortic  arch. Main pulmonary artery is unremarkable. No cardiomegaly  demonstrated.      Lung parenchyma demonstrates scattered calcified granulomas. No  infiltrate or effusion identified. No noncalcified pulmonary nodules  demonstrated.      Visualized osseous structures demonstrates mild anterior osteophyte  formation in the midthoracic spine. No compression deformity  demonstrated.      Abdomen:      Liver is unremarkable.      Spleen is unremarkable.      Adrenal glands are unremarkable      Gallbladder demonstrates Phrygian cap.      Pancreas is unremarkable.      Right kidney is unremarkable.      Left kidney is unremarkable.      Demonstrates no lymphadenopathy. Scattered shotty  subcentimeter lymph  nodes as seen on prior imaging. There is no ascites      Loops of large bowel demonstrate uncomplicated sigmoid and descending  colon diverticulosis. Transverse and descending colon are contracted  limiting characterization. No pericolonic fat stranding demonstrated.  Small bowel loops are nondilated. There is mild stool filled loops of  distal small bowel nonspecific. Appendix is seen and is unremarkable.  Postsurgical changes are demonstrated with enteroenteric anastomosis  in the midline lower abdomen as seen on prior imaging. Minimal  stranding of the adjacent perienteric fat stable from prior imaging.  There is no lymphadenopathy. Stomach is contracted.      CT pelvis:      Unopacified bladder is unremarkable. There is no pelvic  lymphadenopathy. No free fluid demonstrated. Uterus and adnexa  demonstrate asymmetric hypodensity asymmetric hypodensity within the  region of the endometrial canal not seen on prior imaging unclear if  this relates to endometrial thickening measuring 10 mm. Follow-up  with gynecology and dedicated ultrasound of the pelvis recommended.  Adnexa are unremarkable.      Visualized osseous structures demonstrate mild facet arthropathy  lower lumbar spine. No compression deformity in the lumbar spine.  Mild endplate degenerative changes are demonstrated. No significant  narrowing thecal sac        IMPRESSION:  1. No lymphadenopathy in the chest, abdomen, or pelvis.      2. Asymmetric hypodensity demonstrated within the region of the  endometrial canal not seen on prior imaging with further workup  recommended and follow-up with gynecology.      3. Enteroenteric anastomosis in the midline lower abdomen with  findings appearing stable with mild stranding of the perienteric fat  stable from prior imaging.      4. Uncomplicated descending and sigmoid diverticulosis.      Assessment:   Non-Hodgkins lymphoma, follicular abdomen:  - No current evidence of disease recurrence.     - LDH from today is pending.  - CBC with all cell lines within normal limits.    - Patient is asymptomatic and specifically denies drenching night sweats, unexplained weight loss or lymphadenopathy.  Physical exam today was unremarkable.    Health Maintenance:  - up to date mammogram and colonoscopy (no further planned)    Plan:  - 6 month follow-up  - lab on return CBCd CMP LDH        Kj Pinto, VIKKI-CNP

## 2024-09-11 NOTE — PROGRESS NOTES
Patient here for follow up with Kj Pinto CNP. Medications and allergies reviewed with patient.     Patient states that she has some joint pain/ knee pain. She has had a lot of stress and anxiety with sick  at home.    Patient denies nausea, vomiting, fatigue, diarrhea, constipation, difficulty eating or weight loss.     Per orders patient to follow up in 6 months with labs prior to visit.     Patient verbalized understanding, no further questions at this time.

## 2024-09-18 ENCOUNTER — APPOINTMENT (OUTPATIENT)
Dept: HEMATOLOGY/ONCOLOGY | Facility: CLINIC | Age: 79
End: 2024-09-18
Payer: MEDICARE

## 2024-10-08 NOTE — PROGRESS NOTES
[facility]: [facility]  MEDICARE WELLNESS EXAM      Wendy Oswald is a 78 y.o. female that is presenting today for Annual Exam.    Assessment/Plan    Diagnoses and all orders for this visit:  Annual physical exam  Mixed hyperlipidemia  IFG (impaired fasting glucose)  -     POCT glycosylated hemoglobin (Hb A1C) manually resulted  Low vitamin D level  History of non-Hodgkin's lymphoma  Primary open angle glaucoma (POAG) of both eyes, moderate stage  Screening for cardiovascular condition  -     CT cardiac scoring wo IV contrast; Future  Encounter for screening mammogram for breast cancer  -     BI mammo bilateral screening tomosynthesis; Future  Encounter for screening for osteoporosis  -     XR DEXA bone density; Future  Menopause  -     XR DEXA bone density; Future  Other orders  -     Follow Up In Primary Care - Established; Future    Reviewed the screening and prevention schedule.  Reviewed the cardiovascular risk.  I do feel that coronary artery calcium scoring may be helpful in determining the necessity of statin therapy.  Due for bone density testing and mammogram which are ordered.  Discussed stress management at length given her 's recent illness.    ADVANCED CARE PLANNING  Advanced Care Planning was discussed with patient:  The patient has an active advanced care plan on file. The patient has an active surrogate decision-maker on file.  Encouraged the patient to confirm that Living Will and Healthcare Power of  (HCPoA) are accurate and up to date.  Encouraged the patient to confirm that our office be provided a copy of any documentation in the event that anything changes.    ACTIVITIES OF DAILY LIVING  Basic ADLs:  Bathing: Independent, Dressing: Independent, Toileting: Independent, Transferring: Independent, Continence: Independent, Feeding: Independent.    Instrumental ADLs:  Ability to use phone: Independent, Shopping: Independent, Cooking: Independent,  House-keeping: Independent, Laundry: Independent, Transportation: Independent, Medication Management: Independent, Finance Management: Independent.    Subjective   HPI  This patient presents today for annual physical, Medicare wellness exam.  Discussed screening/prevention, healthy lifestyle and code status.   Reviewed the patient's wishes regarding decision making.    Consultant visits and notes reviewed: Hematology-Oncology    Stable from a functional standpoint in regard to ADLs and IADLs.  No recent falls are reported.    The patient denies chest pain and shortness of breath.  No exertion-provoked or anginal-type symptoms are reported.    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    Review of Systems   Constitutional:  Negative for appetite change, chills and fever.   Respiratory:  Negative for cough and shortness of breath.    Cardiovascular:  Negative for chest pain.   Gastrointestinal:  Negative for abdominal pain, diarrhea, nausea and vomiting.   Neurological:  Negative for headaches.   All other systems reviewed and are negative.    Objective   Vitals:    10/09/24 1628   BP: 126/84   Pulse: 64   Temp: 35.6 °C (96 °F)   SpO2: 95%      Body mass index is 29.01 kg/m².  Physical Exam  Vitals reviewed.   Constitutional:       General: She is not in acute distress.     Appearance: She is not toxic-appearing.   HENT:      Head: Normocephalic and atraumatic.      Mouth/Throat:      Mouth: Mucous membranes are moist.   Eyes:      Pupils: Pupils are equal, round, and reactive to light.   Cardiovascular:      Rate and Rhythm: Normal rate and regular rhythm.      Heart sounds: No murmur heard.  Pulmonary:      Breath sounds: Normal breath sounds. No wheezing, rhonchi or rales.   Abdominal:      General: There is no distension.      Palpations: Abdomen is soft.   Musculoskeletal:      Right lower leg: No edema.      Left lower leg: No edema.  "  Neurological:      General: No focal deficit present.      Mental Status: She is alert and oriented to person, place, and time.       Diagnostic Results   Lab Results   Component Value Date    GLUCOSE 98 09/11/2024    CALCIUM 9.4 09/11/2024     09/11/2024    K 3.9 09/11/2024    CO2 25 09/11/2024     (H) 09/11/2024    BUN 30 (H) 09/11/2024    CREATININE 0.86 09/11/2024     Lab Results   Component Value Date    ALT 20 09/11/2024    AST 26 09/11/2024    ALKPHOS 71 09/11/2024    BILITOT 0.4 09/11/2024     Lab Results   Component Value Date    WBC 6.1 09/11/2024    HGB 14.3 09/11/2024    HCT 43.3 09/11/2024    MCV 92 09/11/2024     09/11/2024     Lab Results   Component Value Date    CHOL 235 (H) 02/24/2024    CHOL 227 (H) 10/18/2022    CHOL 208 (H) 11/15/2019     Lab Results   Component Value Date    HDL 42.0 (L) 02/24/2024    HDL 46 (L) 10/18/2022    HDL 66 11/15/2019     Lab Results   Component Value Date    LDLCALC 156 (H) 02/24/2024    LDLCALC 144 (H) 10/18/2022    LDLCALC 125 11/15/2019     Lab Results   Component Value Date    TRIG 183 (H) 02/24/2024    TRIG 187 (H) 10/18/2022    TRIG 85 11/15/2019     No components found for: \"CHOLHDL\"  Lab Results   Component Value Date    HGBA1C 5.5 10/09/2024     Other labs not included in the list above reviewed either before or during this encounter.    History   Past Medical History:   Diagnosis Date    Abnormal abdominal ultrasound     Acute frontal sinusitis 02/29/2024    Allergic rhinitis     Anxiety     Dry eye syndrome of bilateral lacrimal glands     Dysesthesia 09/14/2023    History of malignant neoplasm 02/29/2024    History of non-Hodgkin's lymphoma     Impacted cerumen 02/29/2024    Nuclear senile cataract     Personal history of malignant neoplasm, unspecified     History of malignant neoplasm    Polyp of endometrium 01/21/2024    Postmenopausal bleeding     Primary open angle glaucoma (POAG) 09/14/2023    Primary open angle glaucoma of both " eyes, moderate stage     Primary open-angle glaucoma, bilateral, moderate stage     Pure hypercholesterolemia     Sinusitis     Subclinical hypothyroidism     Unspecified disorder of refraction      Past Surgical History:   Procedure Laterality Date    ANKLE ARTHROPLASTY      DILATION AND CURETTAGE OF UTERUS      OTHER SURGICAL HISTORY  2022    Intestinal surgery    TONSILLECTOMY       Family History   Problem Relation Name Age of Onset    Cervical cancer Mother      Tuberculosis Mother      No Known Problems Father      No Known Problems Brother      No Known Problems Daughter      No Known Problems Son      Cervical cancer Mother's Sister      Stroke Mother's Sister      Cervical cancer Father's Sister      Stroke Father's Sister      Hypertension Father's Sister      Lung cancer Maternal Grandmother      Heart attack Maternal Grandfather      Heart disease Maternal Grandfather      No Known Problems Sibling       Social History     Socioeconomic History    Marital status:      Spouse name: Not on file    Number of children: Not on file    Years of education: Not on file    Highest education level: Not on file   Occupational History    Not on file   Tobacco Use    Smoking status: Former     Current packs/day: 0.00     Types: Cigarettes     Start date:      Quit date:      Years since quittin.8    Smokeless tobacco: Never   Vaping Use    Vaping status: Never Used   Substance and Sexual Activity    Alcohol use: Yes     Alcohol/week: 2.0 standard drinks of alcohol     Types: 2 Glasses of wine per week    Drug use: Never    Sexual activity: Not Currently   Other Topics Concern    Not on file   Social History Narrative    Not on file     Social Determinants of Health     Financial Resource Strain: Not on file   Food Insecurity: Not on file   Transportation Needs: Not on file   Physical Activity: Not on file   Stress: Not on file   Social Connections: Not on file   Intimate Partner Violence:  Not on file   Housing Stability: Not on file     Allergies   Allergen Reactions    Sulfa (Sulfonamide Antibiotics) Nausea/vomiting    House Dust Other     SNEEZING     Current Outpatient Medications on File Prior to Visit   Medication Sig Dispense Refill    ascorbic acid (Vitamin C) 500 mg tablet Take 1 tablet (500 mg) by mouth once daily.      ascorbic acid/vitamin E/biotin (HAIR, SKIN, NAILS WITH BIOTIN ORAL) Take by mouth.      dorzolamide-timoloL (Cosopt) 22.3-6.8 mg/mL ophthalmic solution Administer 1 drop into both eyes 2 times a day. 10 mL 3    ipratropium (Atrovent) 42 mcg (0.06 %) nasal spray Administer 2 sprays into each nostril 4 times a day. 15 mL 2    latanoprost (Xalatan) 0.005 % ophthalmic solution Administer 1 drop into both eyes once daily in the evening. 2.5 mL 11    magnesium 250 mg tablet Take by mouth.      multivitamin tablet Take 1 tablet by mouth once daily.       No current facility-administered medications on file prior to visit.     Immunization History   Administered Date(s) Administered    Flu vaccine, quadrivalent, high-dose, preservative free, age 65y+ (FLUZONE) 09/19/2022    Flu vaccine, trivalent, preservative free, HIGH-DOSE, age 65y+ (Fluzone) 11/18/2015, 10/01/2019    Influenza, seasonal, injectable 10/05/2017, 10/02/2018, 10/15/2019, 09/15/2020, 09/30/2021    Pfizer COVID-19 vaccine, bivalent, age 12 years and older (30 mcg/0.3 mL) 10/03/2022    Pfizer Purple Cap SARS-CoV-2 02/28/2021, 03/21/2021, 03/31/2021, 10/08/2021, 04/07/2022    Pneumococcal conjugate vaccine, 13-valent (PREVNAR 13) 11/20/2019     Patient's medical history was reviewed and updated either before or during this encounter.     Aayush Kirkland MD

## 2024-10-09 ENCOUNTER — OFFICE VISIT (OUTPATIENT)
Dept: PRIMARY CARE | Facility: CLINIC | Age: 79
End: 2024-10-09
Payer: MEDICARE

## 2024-10-09 VITALS
BODY MASS INDEX: 28.85 KG/M2 | OXYGEN SATURATION: 95 % | DIASTOLIC BLOOD PRESSURE: 84 MMHG | TEMPERATURE: 96 F | HEIGHT: 64 IN | SYSTOLIC BLOOD PRESSURE: 126 MMHG | HEART RATE: 64 BPM | WEIGHT: 169 LBS

## 2024-10-09 DIAGNOSIS — Z13.820 ENCOUNTER FOR SCREENING FOR OSTEOPOROSIS: ICD-10-CM

## 2024-10-09 DIAGNOSIS — Z85.72 HISTORY OF NON-HODGKIN'S LYMPHOMA: ICD-10-CM

## 2024-10-09 DIAGNOSIS — Z12.31 ENCOUNTER FOR SCREENING MAMMOGRAM FOR BREAST CANCER: ICD-10-CM

## 2024-10-09 DIAGNOSIS — E78.2 MIXED HYPERLIPIDEMIA: ICD-10-CM

## 2024-10-09 DIAGNOSIS — H40.1132 PRIMARY OPEN ANGLE GLAUCOMA (POAG) OF BOTH EYES, MODERATE STAGE: ICD-10-CM

## 2024-10-09 DIAGNOSIS — R79.89 LOW VITAMIN D LEVEL: ICD-10-CM

## 2024-10-09 DIAGNOSIS — Z00.00 ANNUAL PHYSICAL EXAM: Primary | ICD-10-CM

## 2024-10-09 DIAGNOSIS — Z78.0 MENOPAUSE: ICD-10-CM

## 2024-10-09 DIAGNOSIS — Z13.6 SCREENING FOR CARDIOVASCULAR CONDITION: ICD-10-CM

## 2024-10-09 DIAGNOSIS — R73.01 IFG (IMPAIRED FASTING GLUCOSE): ICD-10-CM

## 2024-10-09 LAB — POC HEMOGLOBIN A1C: 5.5 % (ref 4.2–6.5)

## 2024-10-09 PROCEDURE — 1036F TOBACCO NON-USER: CPT | Performed by: INTERNAL MEDICINE

## 2024-10-09 PROCEDURE — G0439 PPPS, SUBSEQ VISIT: HCPCS | Performed by: INTERNAL MEDICINE

## 2024-10-09 PROCEDURE — 1123F ACP DISCUSS/DSCN MKR DOCD: CPT | Performed by: INTERNAL MEDICINE

## 2024-10-09 PROCEDURE — 99215 OFFICE O/P EST HI 40 MIN: CPT | Performed by: INTERNAL MEDICINE

## 2024-10-09 PROCEDURE — 1159F MED LIST DOCD IN RCRD: CPT | Performed by: INTERNAL MEDICINE

## 2024-10-09 PROCEDURE — 1126F AMNT PAIN NOTED NONE PRSNT: CPT | Performed by: INTERNAL MEDICINE

## 2024-10-09 PROCEDURE — 83036 HEMOGLOBIN GLYCOSYLATED A1C: CPT | Mod: QW | Performed by: INTERNAL MEDICINE

## 2024-10-09 PROCEDURE — 1158F ADVNC CARE PLAN TLK DOCD: CPT | Performed by: INTERNAL MEDICINE

## 2024-10-09 RX ORDER — MAGNESIUM 250 MG
TABLET ORAL
COMMUNITY

## 2024-10-09 RX ORDER — ASCORBIC ACID 500 MG
500 TABLET ORAL DAILY
COMMUNITY

## 2024-10-09 RX ORDER — BISMUTH SUBSALICYLATE 262 MG
1 TABLET,CHEWABLE ORAL DAILY
COMMUNITY

## 2024-10-09 ASSESSMENT — PATIENT HEALTH QUESTIONNAIRE - PHQ9
SUM OF ALL RESPONSES TO PHQ9 QUESTIONS 1 AND 2: 0
1. LITTLE INTEREST OR PLEASURE IN DOING THINGS: NOT AT ALL
2. FEELING DOWN, DEPRESSED OR HOPELESS: NOT AT ALL

## 2024-10-09 ASSESSMENT — ENCOUNTER SYMPTOMS
APPETITE CHANGE: 0
DEPRESSION: 0
NAUSEA: 0
ABDOMINAL PAIN: 0
OCCASIONAL FEELINGS OF UNSTEADINESS: 0
DIARRHEA: 0
FEVER: 0
COUGH: 0
SHORTNESS OF BREATH: 0
VOMITING: 0
CHILLS: 0
HEADACHES: 0
LOSS OF SENSATION IN FEET: 0

## 2024-10-09 ASSESSMENT — PAIN SCALES - GENERAL: PAINLEVEL: 0-NO PAIN

## 2024-10-15 ENCOUNTER — APPOINTMENT (OUTPATIENT)
Dept: OPHTHALMOLOGY | Facility: CLINIC | Age: 79
End: 2024-10-15
Payer: MEDICARE

## 2024-10-15 DIAGNOSIS — H40.1132 PRIMARY OPEN ANGLE GLAUCOMA (POAG) OF BOTH EYES, MODERATE STAGE: Primary | ICD-10-CM

## 2024-10-15 PROCEDURE — 99212 OFFICE O/P EST SF 10 MIN: CPT | Performed by: OPHTHALMOLOGY

## 2024-10-15 ASSESSMENT — EXTERNAL EXAM - LEFT EYE: OS_EXAM: NORMAL

## 2024-10-15 ASSESSMENT — ENCOUNTER SYMPTOMS
MUSCULOSKELETAL NEGATIVE: 0
HEMATOLOGIC/LYMPHATIC NEGATIVE: 0
ENDOCRINE NEGATIVE: 0
EYES NEGATIVE: 0
CARDIOVASCULAR NEGATIVE: 0
PSYCHIATRIC NEGATIVE: 0
NEUROLOGICAL NEGATIVE: 0
ALLERGIC/IMMUNOLOGIC NEGATIVE: 0
GASTROINTESTINAL NEGATIVE: 0
RESPIRATORY NEGATIVE: 0
CONSTITUTIONAL NEGATIVE: 0

## 2024-10-15 ASSESSMENT — REFRACTION_WEARINGRX
OD_CYLINDER: -0.75
OS_SPHERE: -1.75
SPECS_TYPE: SVL
OD_SPHERE: +0.00
OD_AXIS: 096

## 2024-10-15 ASSESSMENT — VISUAL ACUITY
CORRECTION_TYPE: GLASSES
OD_CC: 20/25
OD_CC+: -1
METHOD: SNELLEN - LINEAR
OS_CC: 20/30
OS_CC+: -1

## 2024-10-15 ASSESSMENT — SLIT LAMP EXAM - LIDS
COMMENTS: NORMAL
COMMENTS: NORMAL

## 2024-10-15 ASSESSMENT — PACHYMETRY
OD_CT(UM): 558
OS_CT(UM): 552

## 2024-10-15 ASSESSMENT — TONOMETRY
IOP_METHOD: GOLDMANN APPLANATION
OD_IOP_MMHG: 17
OS_IOP_MMHG: 16

## 2024-10-15 ASSESSMENT — EXTERNAL EXAM - RIGHT EYE: OD_EXAM: NORMAL

## 2024-10-15 ASSESSMENT — PAIN SCALES - GENERAL: PAINLEVEL: 0-NO PAIN

## 2024-10-15 NOTE — PROGRESS NOTES
Assessment/Plan   Problem List Items Addressed This Visit       Primary open angle glaucoma (POAG) of both eyes, moderate stage - Primary     ? 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 Advised cannot justify a change in therapy at this time as close to recent baseline. Will be due for pressure check only in 4 months.          Relevant Orders    Johnson Visual Field - OU - Both Eyes (Completed)       Provided reassurance regarding above diagnoses and care received in the office visit today. Discussed outcomes and options along with the importance of treatment compliance. Understands the importance of any follow up visits. Patient instructed to call/communicate with our office if any new issues, questions, or concerns.     Will plan to see back in 4 months pressure check or sooner PRN

## 2024-10-15 NOTE — ASSESSMENT & PLAN NOTE
? 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 Advised cannot justify a change in therapy at this time as close to recent baseline. Will be due for pressure check only in 4 months.

## 2024-10-15 NOTE — PATIENT INSTRUCTIONS
Thank you so much for choosing me to provide your care today!    If you were dilated your vision may remain blurry   or light sensitive for several hours.    The nature of eye and vision problems can require frequent follow up, please make every effort to adhere to any future appointments.    If you have any issues, questions, or concerns,   please do not hesitate to reach out.    If you receive a survey in regards to your care today, please mention any exceptional care my office staff and/or technicians provided.    You can reach our office at this number:    [phone]    Please consider signing up for and utilizing [employer]!  This is the best way to directly reach me or other  providers

## 2024-10-23 ENCOUNTER — APPOINTMENT (OUTPATIENT)
Dept: RADIOLOGY | Facility: HOSPITAL | Age: 79
End: 2024-10-23
Payer: MEDICARE

## 2024-11-07 ENCOUNTER — TELEPHONE (OUTPATIENT)
Dept: OPHTHALMOLOGY | Facility: CLINIC | Age: 79
End: 2024-11-07
Payer: MEDICARE

## 2024-11-07 DIAGNOSIS — H40.1132 PRIMARY OPEN ANGLE GLAUCOMA (POAG) OF BOTH EYES, MODERATE STAGE: ICD-10-CM

## 2024-11-07 RX ORDER — DORZOLAMIDE HYDROCHLORIDE AND TIMOLOL MALEATE 20; 5 MG/ML; MG/ML
1 SOLUTION/ DROPS OPHTHALMIC 2 TIMES DAILY
Qty: 10 ML | Refills: 3 | Status: SHIPPED | OUTPATIENT
Start: 2024-11-07

## 2024-11-07 RX ORDER — LATANOPROST 50 UG/ML
1 SOLUTION/ DROPS OPHTHALMIC EVERY EVENING
Qty: 2.5 ML | Refills: 11 | Status: SHIPPED | OUTPATIENT
Start: 2024-11-07

## 2024-11-07 NOTE — TELEPHONE ENCOUNTER
PT has one more refill on both of her eye drops for her glaucoma.  Would like both medications called in for the next year to [facility] on [address].      Next office visit 2-19-25

## 2024-12-15 DIAGNOSIS — H40.1132 PRIMARY OPEN ANGLE GLAUCOMA (POAG) OF BOTH EYES, MODERATE STAGE: ICD-10-CM

## 2024-12-16 ENCOUNTER — APPOINTMENT (OUTPATIENT)
Dept: RADIOLOGY | Facility: HOSPITAL | Age: 79
End: 2024-12-16
Payer: MEDICARE

## 2024-12-16 RX ORDER — DORZOLAMIDE HYDROCHLORIDE AND TIMOLOL MALEATE 20; 5 MG/ML; MG/ML
1 SOLUTION/ DROPS OPHTHALMIC 2 TIMES DAILY
Qty: 10 ML | Refills: 3 | OUTPATIENT
Start: 2024-12-16

## 2025-01-09 ENCOUNTER — APPOINTMENT (OUTPATIENT)
Dept: RADIOLOGY | Facility: HOSPITAL | Age: 80
End: 2025-01-09
Payer: MEDICARE

## 2025-01-24 ENCOUNTER — HOSPITAL ENCOUNTER (OUTPATIENT)
Dept: RADIOLOGY | Facility: CLINIC | Age: 80
End: 2025-01-24
Payer: MEDICARE

## 2025-02-07 DIAGNOSIS — H40.1132 PRIMARY OPEN ANGLE GLAUCOMA (POAG) OF BOTH EYES, MODERATE STAGE: ICD-10-CM

## 2025-02-07 RX ORDER — LATANOPROST 50 UG/ML
1 SOLUTION/ DROPS OPHTHALMIC EVERY EVENING
Qty: 2.5 ML | Refills: 11 | Status: SHIPPED | OUTPATIENT
Start: 2025-02-07

## 2025-02-19 ENCOUNTER — APPOINTMENT (OUTPATIENT)
Dept: OPHTHALMOLOGY | Facility: CLINIC | Age: 80
End: 2025-02-19
Payer: MEDICARE

## 2025-03-05 ENCOUNTER — APPOINTMENT (OUTPATIENT)
Dept: OPHTHALMOLOGY | Facility: CLINIC | Age: 80
End: 2025-03-05
Payer: MEDICARE

## 2025-03-05 DIAGNOSIS — H40.1132 PRIMARY OPEN ANGLE GLAUCOMA (POAG) OF BOTH EYES, MODERATE STAGE: Primary | ICD-10-CM

## 2025-03-05 PROCEDURE — 99213 OFFICE O/P EST LOW 20 MIN: CPT | Performed by: OPHTHALMOLOGY

## 2025-03-05 RX ORDER — DORZOLAMIDE HYDROCHLORIDE AND TIMOLOL MALEATE 20; 5 MG/ML; MG/ML
1 SOLUTION/ DROPS OPHTHALMIC 2 TIMES DAILY
Qty: 10 ML | Refills: 3 | Status: SHIPPED | OUTPATIENT
Start: 2025-03-05

## 2025-03-05 RX ORDER — LATANOPROST 50 UG/ML
1 SOLUTION/ DROPS OPHTHALMIC EVERY EVENING
Qty: 2.5 ML | Refills: 11 | Status: SHIPPED | OUTPATIENT
Start: 2025-03-05

## 2025-03-05 ASSESSMENT — ENCOUNTER SYMPTOMS
PSYCHIATRIC NEGATIVE: 0
CARDIOVASCULAR NEGATIVE: 0
ENDOCRINE NEGATIVE: 0
NEUROLOGICAL NEGATIVE: 0
CONSTITUTIONAL NEGATIVE: 0
MUSCULOSKELETAL NEGATIVE: 0
EYES NEGATIVE: 0
HEMATOLOGIC/LYMPHATIC NEGATIVE: 0
GASTROINTESTINAL NEGATIVE: 0
ALLERGIC/IMMUNOLOGIC NEGATIVE: 0
RESPIRATORY NEGATIVE: 0

## 2025-03-05 ASSESSMENT — PACHYMETRY
OD_CT(UM): 558
OS_CT(UM): 552

## 2025-03-05 ASSESSMENT — SLIT LAMP EXAM - LIDS
COMMENTS: NORMAL
COMMENTS: NORMAL

## 2025-03-05 ASSESSMENT — VISUAL ACUITY
OS_CC: 20/20
OD_CC+: -1
METHOD: SNELLEN - LINEAR
CORRECTION_TYPE: GLASSES
OD_CC: 20/20
OS_CC+: -1

## 2025-03-05 ASSESSMENT — EXTERNAL EXAM - RIGHT EYE: OD_EXAM: NORMAL

## 2025-03-05 ASSESSMENT — TONOMETRY
OS_IOP_MMHG: 16
IOP_METHOD: GOLDMANN APPLANATION
OD_IOP_MMHG: 15

## 2025-03-05 ASSESSMENT — REFRACTION_WEARINGRX
OD_CYLINDER: -0.75
OD_SPHERE: +0.00
OD_AXIS: 096
OS_SPHERE: -1.75
SPECS_TYPE: SVL

## 2025-03-05 ASSESSMENT — EXTERNAL EXAM - LEFT EYE: OS_EXAM: NORMAL

## 2025-03-05 ASSESSMENT — PAIN SCALES - GENERAL: PAINLEVEL_OUTOF10: 0-NO PAIN

## 2025-03-05 NOTE — PATIENT INSTRUCTIONS
Thank you so much for choosing me to provide your care today!    If you were dilated your vision may remain blurry   or light sensitive for several hours.    The nature of eye and vision problems can require frequent follow up, please make every effort to adhere to any future appointments.    If you have any issues, questions, or concerns,   please do not hesitate to reach out.    If you receive a survey in regards to your care today, please mention any exceptional care my office staff and/or technicians provided.    You can reach our office at this number:    [phone]    Please consider signing up for and utilizing [employer]!  This is the best way to directly reach me or other  providers

## 2025-03-05 NOTE — ASSESSMENT & PLAN NOTE
Pressure remains stable on treatment. Advised cannot justify a change in therapy unless new Johnson visual field (HVF) issues or OCT changes. Will continue with serial exam, due for full and OCT nerve next visit. Understands to call if any new changes or issues with eyes.

## 2025-03-05 NOTE — PROGRESS NOTES
Assessment/Plan   Problem List Items Addressed This Visit       Primary open angle glaucoma (POAG) of both eyes, moderate stage - Primary     Pressure remains stable on treatment. Advised cannot justify a change in therapy unless new Johnson visual field (HVF) issues or OCT changes. Will continue with serial exam, due for full and OCT nerve next visit. Understands to call if any new changes or issues with eyes.          Relevant Medications    dorzolamide-timoloL (Cosopt) 22.3-6.8 mg/mL ophthalmic solution    latanoprost (Xalatan) 0.005 % ophthalmic solution       Provided reassurance regarding above diagnoses and care received in the office visit today. Discussed outcomes and options along with the importance of treatment compliance. Understands the importance of any follow up visits. Patient instructed to call/communicate with our office if any new issues, questions, or concerns.     Will plan to see back in 4 months full OCT nerve or sooner PRN

## 2025-03-10 ENCOUNTER — APPOINTMENT (OUTPATIENT)
Dept: HEMATOLOGY/ONCOLOGY | Facility: CLINIC | Age: 80
End: 2025-03-10
Payer: MEDICARE

## 2025-03-17 ENCOUNTER — LAB (OUTPATIENT)
Dept: LAB | Facility: CLINIC | Age: 80
End: 2025-03-17
Payer: MEDICARE

## 2025-03-17 ENCOUNTER — OFFICE VISIT (OUTPATIENT)
Dept: HEMATOLOGY/ONCOLOGY | Facility: CLINIC | Age: 80
End: 2025-03-17
Payer: MEDICARE

## 2025-03-17 VITALS
HEART RATE: 56 BPM | WEIGHT: 154.76 LBS | SYSTOLIC BLOOD PRESSURE: 139 MMHG | DIASTOLIC BLOOD PRESSURE: 75 MMHG | RESPIRATION RATE: 16 BRPM | BODY MASS INDEX: 26.57 KG/M2 | TEMPERATURE: 97.7 F | OXYGEN SATURATION: 98 %

## 2025-03-17 DIAGNOSIS — Z85.72 HISTORY OF NON-HODGKIN'S LYMPHOMA: ICD-10-CM

## 2025-03-17 DIAGNOSIS — Z85.72 HISTORY OF NON-HODGKIN'S LYMPHOMA: Primary | ICD-10-CM

## 2025-03-17 LAB
ALBUMIN SERPL BCP-MCNC: 4.2 G/DL (ref 3.4–5)
ALP SERPL-CCNC: 77 U/L (ref 33–136)
ALT SERPL W P-5'-P-CCNC: 12 U/L (ref 7–45)
ANION GAP SERPL CALC-SCNC: 12 MMOL/L (ref 10–20)
AST SERPL W P-5'-P-CCNC: 18 U/L (ref 9–39)
BASOPHILS # BLD AUTO: 0.03 X10*3/UL (ref 0–0.1)
BASOPHILS NFR BLD AUTO: 0.4 %
BILIRUB SERPL-MCNC: 0.7 MG/DL (ref 0–1.2)
BUN SERPL-MCNC: 22 MG/DL (ref 6–23)
CALCIUM SERPL-MCNC: 9.2 MG/DL (ref 8.6–10.3)
CHLORIDE SERPL-SCNC: 107 MMOL/L (ref 98–107)
CO2 SERPL-SCNC: 27 MMOL/L (ref 21–32)
CREAT SERPL-MCNC: 0.69 MG/DL (ref 0.5–1.05)
EGFRCR SERPLBLD CKD-EPI 2021: 90 ML/MIN/1.73M*2
EOSINOPHIL # BLD AUTO: 0.14 X10*3/UL (ref 0–0.4)
EOSINOPHIL NFR BLD AUTO: 2.1 %
ERYTHROCYTE [DISTWIDTH] IN BLOOD BY AUTOMATED COUNT: 12.8 % (ref 11.5–14.5)
GLUCOSE SERPL-MCNC: 89 MG/DL (ref 74–99)
HCT VFR BLD AUTO: 46.9 % (ref 36–46)
HGB BLD-MCNC: 14.9 G/DL (ref 12–16)
IMM GRANULOCYTES # BLD AUTO: 0.02 X10*3/UL (ref 0–0.5)
IMM GRANULOCYTES NFR BLD AUTO: 0.3 % (ref 0–0.9)
LDH SERPL L TO P-CCNC: 221 U/L (ref 84–246)
LYMPHOCYTES # BLD AUTO: 1.77 X10*3/UL (ref 0.8–3)
LYMPHOCYTES NFR BLD AUTO: 26.4 %
MCH RBC QN AUTO: 29.4 PG (ref 26–34)
MCHC RBC AUTO-ENTMCNC: 31.8 G/DL (ref 32–36)
MCV RBC AUTO: 93 FL (ref 80–100)
MONOCYTES # BLD AUTO: 0.5 X10*3/UL (ref 0.05–0.8)
MONOCYTES NFR BLD AUTO: 7.5 %
NEUTROPHILS # BLD AUTO: 4.24 X10*3/UL (ref 1.6–5.5)
NEUTROPHILS NFR BLD AUTO: 63.3 %
NRBC BLD-RTO: 0 /100 WBCS (ref 0–0)
PLATELET # BLD AUTO: 207 X10*3/UL (ref 150–450)
POTASSIUM SERPL-SCNC: 3.9 MMOL/L (ref 3.5–5.3)
PROT SERPL-MCNC: 6.5 G/DL (ref 6.4–8.2)
RBC # BLD AUTO: 5.07 X10*6/UL (ref 4–5.2)
SODIUM SERPL-SCNC: 142 MMOL/L (ref 136–145)
WBC # BLD AUTO: 6.7 X10*3/UL (ref 4.4–11.3)

## 2025-03-17 PROCEDURE — 1160F RVW MEDS BY RX/DR IN RCRD: CPT | Performed by: NURSE PRACTITIONER

## 2025-03-17 PROCEDURE — 80053 COMPREHEN METABOLIC PANEL: CPT

## 2025-03-17 PROCEDURE — 85025 COMPLETE CBC W/AUTO DIFF WBC: CPT

## 2025-03-17 PROCEDURE — 1159F MED LIST DOCD IN RCRD: CPT | Performed by: NURSE PRACTITIONER

## 2025-03-17 PROCEDURE — 83615 LACTATE (LD) (LDH) ENZYME: CPT

## 2025-03-17 PROCEDURE — 99213 OFFICE O/P EST LOW 20 MIN: CPT | Performed by: NURSE PRACTITIONER

## 2025-03-17 PROCEDURE — 1126F AMNT PAIN NOTED NONE PRSNT: CPT | Performed by: NURSE PRACTITIONER

## 2025-03-17 PROCEDURE — 36415 COLL VENOUS BLD VENIPUNCTURE: CPT

## 2025-03-17 ASSESSMENT — PAIN SCALES - GENERAL: PAINLEVEL_OUTOF10: 0-NO PAIN

## 2025-03-17 NOTE — PROGRESS NOTES
Patient ID: Wendy Oswald is a 79 y.o. female.    Cancer History:  Non-Hodgkin's, stage 4     History of Present Illness:  In 2007 she had presented with small bowel perforation. 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  After surgery she received one cycle of Rituxan without any problems. She had a PET-CT then that showed several foci of activity corresponding to the right common iliac, left iliac, terminal iliac and bilateral inguinal lymph nodes. There was moderate hypermetabolic activity in the lower mesenteric root at the level of the aortic bifurcation. The largest lymph node measured about 2.5 cm. There was also  a left external iliac node hypermetabolic along with two small foci in the upper medial aspect of the bilateral iliac just above the sacroiliac joints.    She did well until 2009 when there was an uptake in the terminal ileum. Attempts at biopsy were not diagnostic. At that point she was re-treated with one course of Rituxan followed by maintenance Rituxan for which she completed two years of therapy.    Interval History:  Patient presents today for routine 6-month follow-up evaluation for history of non-Hodgkin's lymphoma.  On presentation today patient is doing very well she has no complaints.  She denies any fever, chills or drenching night sweats.  No cough, chest pain or shortness of breath.  No nausea or vomiting.  No constipation or diarrhea.  Her appetite is good. She is gaining weight (stress eating).    There was concern in March of 2022 when her LDH had risen to 254 CT scan was completed and found to be negative.  Most recent LDH, September 2022 was found to be 212.  At our last visit LDH found to be 273 September therefore CT scan pursued. No lymphadenopathy.  However, asymmetric hypodensity demonstrated within the region of the endometrial canal. Followed up with Dr. German in gynecology. Faheem  completed, negative pathology.   No further images are planned unless LDH is again found to be significantly elevated.    2025:  Patient presents today for routine 6-month follow-up evaluation for history of non-Hodgkin's lymphoma on observation.  On presentation today patient is doing very well she has no physical complaints.  She denies any fever, chills or drenching night sweats.  No cough, chest pain or shortness of breath.  No nausea or vomiting.  No constipation or diarrhea. Her appetite is good. She is noted to have weight loss; however she has been providing 24 hour care for her  who  this past Saturday. Will monitor weight.     Review of Systems:  A review of systems has been completed and are negative for complaints except what is stated in the HPI and/or past medical history    Allergies:  sulfa drugs     Medications:    Current Outpatient Medications:     ascorbic acid (Vitamin C) 500 mg tablet, Take 1 tablet (500 mg) by mouth once daily., Disp: , Rfl:     ascorbic acid/vitamin E/biotin (HAIR, SKIN, NAILS WITH BIOTIN ORAL), Take by mouth., Disp: , Rfl:     dorzolamide-timoloL (Cosopt) 22.3-6.8 mg/mL ophthalmic solution, Administer 1 drop into both eyes 2 times a day., Disp: 10 mL, Rfl: 3    latanoprost (Xalatan) 0.005 % ophthalmic solution, Administer 1 drop into both eyes once daily in the evening., Disp: 2.5 mL, Rfl: 11    magnesium 250 mg tablet, Take by mouth., Disp: , Rfl:     multivitamin tablet, Take 1 tablet by mouth once daily., Disp: , Rfl:     ipratropium (Atrovent) 42 mcg (0.06 %) nasal spray, Administer 2 sprays into each nostril 4 times a day., Disp: 15 mL, Rfl: 2      Past Medical History:  NHL,  -endometrial polyp, vitamin D deficiency, hypocholesterolemia     Family History:  Cervical cancer in her mother and an aunt  Lung cancer - grandmother    Social History:    - 2025  10-12 year tobacco exposure  Worked in office setting    Vital Signs:  BP  139/75 (BP Location: Right arm)   Pulse 56   Temp 36.5 °C (97.7 °F)   Resp 16   Wt 70.2 kg (154 lb 12.2 oz)   SpO2 98%   BMI 26.57 kg/m²     Wt Readings from Last 5 Encounters:   25 70.2 kg (154 lb 12.2 oz)   10/09/24 76.7 kg (169 lb)   24 79.5 kg (175 lb 4.3 oz)   24 78 kg (172 lb 1.1 oz)   24 78.4 kg (172 lb 12.8 oz)       Physical Exam:  ECO  Pain: 0  Constitutional: Well developed, awake/alert/oriented x3, no distress, alert and cooperative  Eyes: PER. sclera anicteric  ENMT: Oral mucosa moist  Respiratory/Thorax: Breathing is non-labored. Lungs are clear to auscultation bilaterally. No adventitious breath sounds  Cardiovascular: S1-S2. Regular rate and rhythm. No murmurs, rubs, or gallops appreciated  Gastrointestinal: Abdomen soft nontender, nondistended, normal active bowel sounds.   Musculoskeletal: ROM intact, no joint swelling, normal strength  Extremities: normal extremities, no cyanosis, no edema, no clubbing  Neurologic: alert and oriented x3. Nonfocal exam. No myoclonus  Psychological: Pleasant, appropriate and easily engaged     Lab Results:  Lab Results   Component Value Date    WBC 6.7 2025    HGB 14.9 2025    HCT 46.9 (H) 2025    MCV 93 2025     2025      Lab Results   Component Value Date    NEUTROABS 4.24 2025           Radiology Result:  STUDY:  CT CHEST ABDOMEN PELVIS WITH AND WITHOUT IV CONTRAST; 10/2/2023 3:34pm      INDICATION:  Signs/Symptoms:Diffuse follicle center lymphoma of intra-abdominal lymph nodes.      COMPARISON:  2022.      ACCESSION NUMBER(S):  [id number]      ORDERING CLINICIAN:  SPENCER HARLEY      TECHNIQUE:  CT axial images through the chest, abdomen, and pelvis with intravenous contrast, 75 ml of Omnipaque 350 was administered. Oral contrast was not administered. Post processing sagittal and coronal reconstruction images were also performed.      FINDINGS:  Chest:      Mediastinum  demonstrates no lymphadenopathy. Scattered subcentimeter lymph nodes. There is hilar lymphadenopathy. Esophagus is unremarkable. Small paraesophageal hernia as seen on prior imaging.      Heart and great vessels demonstrate ascending thoracic aorta to measure 3.2 cm. There is mild vascular calcification of the aortic arch. Main pulmonary artery is unremarkable. No cardiomegaly demonstrated.      Lung parenchyma demonstrates scattered calcified granulomas. No infiltrate or effusion identified. No noncalcified pulmonary nodules demonstrated.      Visualized osseous structures demonstrates mild anterior osteophyte formation in the midthoracic spine. No compression deformity demonstrated.      Abdomen:  Liver is unremarkable.  Spleen is unremarkable.  Adrenal glands are unremarkable  Gallbladder demonstrates Phrygian cap.  Pancreas is unremarkable.  Right kidney is unremarkable.  Left kidney is unremarkable.      Demonstrates no lymphadenopathy. Scattered shotty subcentimeter lymph nodes as seen on prior imaging. There is no ascites      Loops of large bowel demonstrate uncomplicated sigmoid and descending colon diverticulosis. Transverse and descending colon are contracted limiting characterization. No pericolonic fat stranding demonstrated. Small bowel loops are nondilated. There is mild stool filled loops of distal small bowel nonspecific. Appendix is seen and is unremarkable. Postsurgical changes are demonstrated with enteroenteric anastomosis in the midline lower abdomen as seen on prior imaging. Minimal  stranding of the adjacent perienteric fat stable from prior imaging. There is no lymphadenopathy. Stomach is contracted.      CT pelvis:  Unopacified bladder is unremarkable. There is no pelvic lymphadenopathy. No free fluid demonstrated. Uterus and adnexa  demonstrate asymmetric hypodensity asymmetric hypodensity within the region of the endometrial canal not seen on prior imaging unclear if this relates to  endometrial thickening measuring 10 mm. Follow-up with gynecology and dedicated ultrasound of the pelvis recommended. Adnexa are unremarkable.      Visualized osseous structures demonstrate mild facet arthropathy lower lumbar spine. No compression deformity in the lumbar spine. Mild endplate degenerative changes are demonstrated. No significant narrowing thecal sac        IMPRESSION:  1. No lymphadenopathy in the chest, abdomen, or pelvis.      2. Asymmetric hypodensity demonstrated within the region of the endometrial canal not seen on prior imaging with further workup recommended and follow-up with gynecology.      3. Enteroenteric anastomosis in the midline lower abdomen with findings appearing stable with mild stranding of the perienteric fat stable from prior imaging.      4. Uncomplicated descending and sigmoid diverticulosis.      Assessment:   Non-Hodgkins lymphoma, follicular - abdomen:  - No current evidence of disease recurrence.    - LDH from today is pending.  - CBC with all cell lines within normal limits.    - Patient is asymptomatic and specifically denies drenching night sweats, unexplained weight loss or lymphadenopathy.  Physical exam today was unremarkable.    Health Maintenance:  - up to date mammogram and colonoscopy (no further planned)    Plan:  - 6 month follow-up  - lab on return CBCd CMP LDH        Kj Pinto, APRN-CNP

## 2025-03-17 NOTE — PROGRESS NOTES
Patient here for follow up visit hx non-hodgkin lymphoma, 6 month FUV. Currently on surveillance.       Patients  passed away on Saturday. He had been sick for sometime.     Continues wit Diarrhea as previously, if eats too much grease has loose stools or stress as she is haying due to this loss at this time.   Patient here alone    Medications and Allergies reviewed and reconciled this visit.    Follow up per MD request.    Patient reports availability and use of mychart, Reviewed this is a good place to communicate with the team as well as review labs and upcoming orders.      No barriers to education noted, patient agrees to current plan and verbalized understanding using teach back method.

## 2025-03-18 ENCOUNTER — TELEPHONE (OUTPATIENT)
Dept: HEMATOLOGY/ONCOLOGY | Facility: CLINIC | Age: 80
End: 2025-03-18
Payer: MEDICARE

## 2025-03-18 NOTE — TELEPHONE ENCOUNTER
Call placed to Wendy Pintos request. To notify her that her labs completed yesterday look very good. She should schedule a follow up appointment in 6 months.   Detailed message left for patient on verifiable line.

## 2025-04-09 ENCOUNTER — APPOINTMENT (OUTPATIENT)
Dept: PRIMARY CARE | Facility: CLINIC | Age: 80
End: 2025-04-09
Payer: MEDICARE

## 2025-07-10 ENCOUNTER — APPOINTMENT (OUTPATIENT)
Dept: OPHTHALMOLOGY | Facility: CLINIC | Age: 80
End: 2025-07-10
Payer: MEDICARE

## 2025-07-10 DIAGNOSIS — H40.1132 PRIMARY OPEN ANGLE GLAUCOMA (POAG) OF BOTH EYES, MODERATE STAGE: Primary | ICD-10-CM

## 2025-07-10 DIAGNOSIS — H25.813 COMBINED FORMS OF AGE-RELATED CATARACT OF BOTH EYES: ICD-10-CM

## 2025-07-10 DIAGNOSIS — H52.7 UNSPECIFIED DISORDER OF REFRACTION: ICD-10-CM

## 2025-07-10 DIAGNOSIS — H04.123 INSUFFICIENCY OF TEAR FILM OF BOTH EYES: ICD-10-CM

## 2025-07-10 PROCEDURE — 92133 CPTRZD OPH DX IMG PST SGM ON: CPT | Performed by: OPHTHALMOLOGY

## 2025-07-10 PROCEDURE — 92015 DETERMINE REFRACTIVE STATE: CPT | Performed by: OPHTHALMOLOGY

## 2025-07-10 PROCEDURE — 99214 OFFICE O/P EST MOD 30 MIN: CPT | Performed by: OPHTHALMOLOGY

## 2025-07-10 ASSESSMENT — REFRACTION_WEARINGRX
SPECS_TYPE: READERS
OS_SPHERE: -1.75
OD_CYLINDER: -0.75
SPECS_TYPE: SVL
OD_SPHERE: +2.50
OD_AXIS: 090
OD_SPHERE: +0.00
OD_CYLINDER: -0.75
OD_AXIS: 096
OS_SPHERE: +0.75

## 2025-07-10 ASSESSMENT — TONOMETRY
OS_IOP_MMHG: 15
IOP_METHOD: GOLDMANN APPLANATION
OD_IOP_MMHG: 17

## 2025-07-10 ASSESSMENT — REFRACTION_MANIFEST
OD_CYLINDER: -0.50
OS_SPHERE: -1.50
OD_SPHERE: +0.25
OS_SPHERE: -1.00
METHOD_AUTOREFRACTION: 1
OS_CYLINDER: -0.25
OD_CYLINDER: -0.75
OS_AXIS: 120
OD_AXIS: 080
OD_ADD: +2.75
OD_AXIS: 095
OD_SPHERE: +0.00
OS_CYLINDER: -0.50
OS_AXIS: 120
OS_ADD: +2.75

## 2025-07-10 ASSESSMENT — PAIN SCALES - GENERAL: PAINLEVEL_OUTOF10: 0-NO PAIN

## 2025-07-10 ASSESSMENT — CUP TO DISC RATIO
OS_RATIO: 0.5
OD_RATIO: 0.7

## 2025-07-10 ASSESSMENT — VISUAL ACUITY
CORRECTION_TYPE: GLASSES
OS_CC+: +1
OD_CC: 20/30
OS_CC: 20/40
METHOD: SNELLEN - LINEAR

## 2025-07-10 ASSESSMENT — ENCOUNTER SYMPTOMS
HEMATOLOGIC/LYMPHATIC NEGATIVE: 0
CONSTITUTIONAL NEGATIVE: 0
NEUROLOGICAL NEGATIVE: 0
EYES NEGATIVE: 0
GASTROINTESTINAL NEGATIVE: 0
ENDOCRINE NEGATIVE: 0
ALLERGIC/IMMUNOLOGIC NEGATIVE: 0
MUSCULOSKELETAL NEGATIVE: 0
CARDIOVASCULAR NEGATIVE: 0
PSYCHIATRIC NEGATIVE: 0
RESPIRATORY NEGATIVE: 0

## 2025-07-10 ASSESSMENT — PACHYMETRY
OD_CT(UM): 558
OS_CT(UM): 552

## 2025-07-10 ASSESSMENT — SLIT LAMP EXAM - LIDS
COMMENTS: NORMAL
COMMENTS: NORMAL

## 2025-07-10 ASSESSMENT — EXTERNAL EXAM - RIGHT EYE: OD_EXAM: NORMAL

## 2025-07-10 ASSESSMENT — EXTERNAL EXAM - LEFT EYE: OS_EXAM: NORMAL

## 2025-07-10 NOTE — ASSESSMENT & PLAN NOTE
Stable OCT nerve without evidence of progression. No change to treatment. Will continue to monitor with serial exam, due for Johnson visual field (HVF) next visit.

## 2025-07-10 NOTE — PATIENT INSTRUCTIONS
Thank you so much for choosing me to provide your care today!    If you were dilated your vision may remain blurry   or light sensitive for several hours.    The nature of eye and vision problems can require frequent follow up, please make every effort to adhere to any future appointments.    If you have any issues, questions, or concerns,   please do not hesitate to reach out.    If you receive a survey in regards to your care today, please mention any exceptional care my office staff and/or technicians provided.    You can reach our office at this number:    [phone]    Please consider signing up for and utilizing [employer]!  This is the best way to directly reach me or other  providers

## 2025-07-10 NOTE — PROGRESS NOTES
Assessment/Plan   Problem List Items Addressed This Visit       Combined forms of age-related cataract of both eyes    Non significant cataract noted on exam. Discussed the natural course of cataract and may require surgery at some point in the future. Will plan to continue to monitor with serial exam.            Primary open angle glaucoma (POAG) of both eyes, moderate stage - Primary    Stable OCT nerve without evidence of progression. No change to treatment. Will continue to monitor with serial exam, due for Johnson visual field (HVF) next visit.          Relevant Orders    OCT, Optic Nerve - OU - Both Eyes (Completed)    Tear film insufficiency    Advised on role of regular lubrication for best ocular comfort and vision.            Unspecified disorder of refraction    New Rx for glasses/SCL given per patient request. Patient's signature obtained to acknowledge and confirm that a paper copy of glasses/SCL Rx was given to patient in compliance with [facility] Eyeglass Rule. Electronic copy of Rx will also be available via [employer].               Provided reassurance regarding above diagnoses and care received in the office visit today. Discussed outcomes and options along with the importance of treatment compliance. Understands the importance of any follow up visits. Patient instructed to call/communicate with our office if any new issues, questions, or concerns.     Will plan to see back in 4 months Johnson visual field (HVF)  or sooner PRN

## 2025-07-10 NOTE — ASSESSMENT & PLAN NOTE
New Rx for glasses/SCL given per patient request. Patient's signature obtained to acknowledge and confirm that a paper copy of glasses/SCL Rx was given to patient in compliance with [facility] Eyeglass Rule. Electronic copy of Rx will also be available via [employer].

## 2025-11-12 ENCOUNTER — APPOINTMENT (OUTPATIENT)
Dept: OPHTHALMOLOGY | Facility: CLINIC | Age: 80
End: 2025-11-12
Payer: MEDICARE

## (undated) DEVICE — Device

## (undated) DEVICE — CATHETER, RED RUBBER 16FR

## (undated) DEVICE — SOLUTION, INJECTION, 0.9% SODIUM CHL, USP LIFECARE 1000 MI

## (undated) DEVICE — DEVICE, TISSUE REMOVAL, MYOSURE

## (undated) DEVICE — PREP TRAY, VAGINAL

## (undated) DEVICE — GLOVE, SURGICAL, PROTEXIS PI , 6.0, PF, LF

## (undated) DEVICE — SEAL SET, MYOSURE ROD LENS SCOPE , SINGLE USE